# Patient Record
Sex: FEMALE | Race: WHITE | Employment: PART TIME | URBAN - METROPOLITAN AREA
[De-identification: names, ages, dates, MRNs, and addresses within clinical notes are randomized per-mention and may not be internally consistent; named-entity substitution may affect disease eponyms.]

---

## 2017-12-04 ENCOUNTER — HOSPITAL ENCOUNTER (OUTPATIENT)
Dept: PREADMISSION TESTING | Age: 57
Discharge: HOME | End: 2017-12-04
Payer: COMMERCIAL

## 2017-12-04 ENCOUNTER — HOSPITAL ENCOUNTER (OUTPATIENT)
Dept: LAB | Age: 57
Discharge: HOME | End: 2017-12-04
Payer: COMMERCIAL

## 2017-12-04 ENCOUNTER — HOSPITAL ENCOUNTER (OUTPATIENT)
Dept: LAB | Age: 57
Discharge: HOME | End: 2017-12-04

## 2017-12-04 DIAGNOSIS — Z01.818 PRE-OP TESTING: ICD-10-CM

## 2017-12-04 LAB — SENTARA SPECIMEN COL,SENBCF: NORMAL

## 2017-12-04 PROCEDURE — 86870 RBC ANTIBODY IDENTIFICATION: CPT | Performed by: OBSTETRICS & GYNECOLOGY

## 2017-12-04 PROCEDURE — 86900 BLOOD TYPING SEROLOGIC ABO: CPT | Performed by: OBSTETRICS & GYNECOLOGY

## 2017-12-04 PROCEDURE — 99001 SPECIMEN HANDLING PT-LAB: CPT | Performed by: OBSTETRICS & GYNECOLOGY

## 2017-12-04 PROCEDURE — 93005 ELECTROCARDIOGRAM TRACING: CPT

## 2017-12-04 RX ORDER — LOSARTAN POTASSIUM 100 MG/1
100 TABLET ORAL DAILY
COMMUNITY

## 2017-12-04 RX ORDER — SODIUM CHLORIDE, SODIUM LACTATE, POTASSIUM CHLORIDE, CALCIUM CHLORIDE 600; 310; 30; 20 MG/100ML; MG/100ML; MG/100ML; MG/100ML
125 INJECTION, SOLUTION INTRAVENOUS CONTINUOUS
Status: CANCELLED | OUTPATIENT
Start: 2017-12-13 | End: 2017-12-14

## 2017-12-04 RX ORDER — FLUTICASONE PROPIONATE AND SALMETEROL 250; 50 UG/1; UG/1
1 POWDER RESPIRATORY (INHALATION) EVERY 12 HOURS
COMMUNITY

## 2017-12-04 RX ORDER — SODIUM CHLORIDE 0.9 % (FLUSH) 0.9 %
5-10 SYRINGE (ML) INJECTION EVERY 8 HOURS
Status: CANCELLED | OUTPATIENT
Start: 2017-12-04

## 2017-12-04 RX ORDER — SODIUM CHLORIDE 0.9 % (FLUSH) 0.9 %
5-10 SYRINGE (ML) INJECTION AS NEEDED
Status: CANCELLED | OUTPATIENT
Start: 2017-12-04

## 2017-12-04 NOTE — PERIOP NOTES
PAT - SURGICAL PRE-ADMISSION INSTRUCTIONS    NAME:  Blossom Callahan                                                          TODAY'S DATE:  12/4/2017    SURGERY DATE:  12/13/2017                                  SURGERY ARRIVAL TIME:   0600    1. Do NOT eat or drink anything, including candy or gum, after MIDNIGHT on 12/12/17 , unless you have specific instructions from your Surgeon or Anesthesia Provider to do so. 2. No smoking on the day of surgery. 3. No alcohol 24 hours prior to the day of surgery. 4. No recreational drugs for one week prior to the day of surgery. 5. Leave all valuables, including money/purse, at home. 6. Remove all jewelry, nail polish, makeup (including mascara); no lotions, powders, deodorant, or perfume/cologne/after shave. 7. Glasses/Contact lenses and Dentures may be worn to the hospital.  They will be removed prior to surgery. 8. Call your doctor if symptoms of a cold or illness develop within 24 ours prior to surgery. 9. AN ADULT MUST DRIVE YOU HOME AFTER OUTPATIENT SURGERY. 10. If you are having an OUTPATIENT procedure, please make arrangements for a responsible adult to be with you for 24 hours after your surgery. 11. If you are admitted to the hospital, you will be assigned to a bed after surgery is complete. Normally a family member will not be able to see you until you are in your assigned bed. 15. Family is encouraged to accompany you to the hospital.  We ask visitors in the treatment area to be limited to ONE person at a time to ensure patient privacy. EXCEPTIONS WILL BE MADE AS NEEDED. 15. Children under 12 are discouraged from entering the treatment area and need to be supervised by an adult when in the waiting room. Special Instructions:    Bring inhaler. , Take these medications the morning of surgery with a sip of water:  LEVOTHYROXINE, STOP anticoagulants AT LEAST 1 WEEK PRIOR to your surgery or, follow other MD instructions:  ANY ASPIRIN PRODUCTS    Patient Prep:    use CHG solution    These surgical instructions were reviewed with PATIENT during the PAT VISIT. A printed copy of the instructions was provided to PATIENT. Directions: On the morning of surgery, please go to the [facility]. Enter the building from the [facility] entrance, 1st floor (next to the Emergency Room entrance). Take the elevator to the 2nd floor. Sign in at the Registration Desk.     If you have any questions and/or concerns, please do not hesitate to call:  (Prior to the day of surgery)  [facility] unit:  [phone]  (Day of surgery)  [facility] unit:  [phone]

## 2017-12-05 LAB
ATRIAL RATE: 89 BPM
CALCULATED P AXIS, ECG09: 45 DEGREES
CALCULATED R AXIS, ECG10: 40 DEGREES
CALCULATED T AXIS, ECG11: 60 DEGREES
DIAGNOSIS, 93000: NORMAL
P-R INTERVAL, ECG05: 186 MS
Q-T INTERVAL, ECG07: 380 MS
QRS DURATION, ECG06: 92 MS
QTC CALCULATION (BEZET), ECG08: 462 MS
VENTRICULAR RATE, ECG03: 89 BPM

## 2017-12-12 ENCOUNTER — ANESTHESIA EVENT (OUTPATIENT)
Dept: SURGERY | Age: 57
End: 2017-12-12
Payer: COMMERCIAL

## 2017-12-12 NOTE — H&P
[address] [facility]  [address]  [phone]       Gynecology History and Physical    Name: Dl Valdez MRN: [id number] SSN: [id number]    YOB: 1960  Age: 56 y.o. Sex: female       Subjective:      Chief complaint:   n92.0 menorrhagia w/reg cycle    Ama is a 56 y.o. WHITE OR  female with a history of n92.0 menorrhagia w/reg cycle. She is admitted for Procedure(s) (LRB):  HYSTEROSCOPY,endometerial ablation possible dilation and curttage (N/A). Initial visit 11/13 for irregular menses for 18 months and occasional heavy bleeding. 11/13 Labs : hgb 14, TSH 3.5, ferritin 54, Pap: NIL/HPV HR neg   11/20 : US and endo biopsy performed   US: 9.2x5. 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 Social History Main Topics    Smoking status: Never Smoker    Smokeless tobacco: Never Used    Alcohol use No    Drug use: No    Sexual activity: Not on file     History reviewed. No pertinent family history. Allergies   Allergen Reactions    Shellfish Derived Anaphylaxis    Augmentin [Amoxicillin-Pot Clavulanate] Hives    Erythromycin Other (comments)     Bronchial spasms     Prior to Admission medications    Medication Sig Start Date End Date Taking?  Authorizing Provider   BUTALB/ACETAMINOPHEN/CAFFEINE (FIORICET PO) Take by mouth. Yes Historical Provider   fluticasone-salmeterol (ADVAIR DISKUS) 250-50 mcg/dose diskus inhaler Take 1 Puff by inhalation every twelve (12) hours. Yes Historical Provider   losartan (COZAAR) 100 mg tablet Take 100 mg by mouth daily. Yes Historical Provider   levothyroxine (SYNTHROID) 25 mcg tablet Take 25 mcg by mouth Daily (before breakfast). Yes Historical Provider   SUMAtriptan (IMITREX) 6 mg/0.5 mL injection 6 mg by SubCUTAneous route once. Yes Historical Provider   promethazine (PHENERGAN) 50 mg suppository Insert 50 mg into rectum every six (6) hours as needed for Nausea. Yes Historical Provider   naproxen (NAPROSYN) 500 mg tablet Take 500 mg by mouth as needed. Yes Historical Provider   albuterol (PROVENTIL HFA, VENTOLIN HFA) 90 mcg/actuation inhaler Take  by inhalation every four (4) hours as needed for Wheezing. Historical Provider        Review of Systems:  A comprehensive review of systems was negative except for that written in the History of Present Illness.      Objective:     Vitals:    12/04/17 1552   Weight: 70.3 kg (155 lb)   Height: 5' 4.5\" (1.638 m)       Labs:       WBC   Date/Time Value Ref Range Status   09/08/2014 01:19 PM 7.0 4.6 - 13.2 K/uL Final     HGB   Date/Time Value Ref Range Status   09/08/2014 01:19 PM 15.0 12.0 - 16.0 g/dL Final     PLATELET   Date/Time Value Ref Range Status   09/08/2014 01:19  135 - 420 K/uL Final       Physical Examination:  General appearance: - alert, well appearing, and in no distress  Mental status - alert, oriented to person, place, and time  Neurological - alert, oriented, normal speech, no focal findings or movement disorder noted  Extremities - peripheral pulses normal, no pedal edema, no clubbing or cyanosis  Chest - clear to auscultation and percussion, no wheezes, rales or rhonchi, symmetric air entry  Heart - normal rate, regular rhythm, normal S1, S2, no murmurs, rubs, clicks or gallops  Abdomen - soft, nontender, nondistended, no masses or organomegaly  Pelvic - normal external genitalia, vulva, vagina, cervix, uterus and adnexa          Assessment:      with  n92.0 menorrhagia w/reg cycle, endometrial polyp     Plan:     Procedure(s) (LRB):  HYSTEROSCOPY,endometerial ablation possible dilation and curttage (N/A)      Discussed the risks of surgery including the risks of bleeding, infection, deep vein thrombosis, and surgical injuries to internal organs including but not limited to the bowels, bladder, rectum, and female reproductive organs. The patient understands the risks; any and all questions were answered to the patient's satisfaction.     Signed By:  Zoe Fox MD     December 12, 2017

## 2017-12-13 ENCOUNTER — ANESTHESIA (OUTPATIENT)
Dept: SURGERY | Age: 57
End: 2017-12-13
Payer: COMMERCIAL

## 2017-12-13 ENCOUNTER — HOSPITAL ENCOUNTER (OUTPATIENT)
Age: 57
Setting detail: OUTPATIENT SURGERY
Discharge: HOME | End: 2017-12-13
Attending: OBSTETRICS & GYNECOLOGY | Admitting: OBSTETRICS & GYNECOLOGY
Payer: COMMERCIAL

## 2017-12-13 VITALS
WEIGHT: 159.56 LBS | DIASTOLIC BLOOD PRESSURE: 72 MMHG | OXYGEN SATURATION: 95 % | HEIGHT: 65 IN | RESPIRATION RATE: 16 BRPM | TEMPERATURE: 98.3 F | SYSTOLIC BLOOD PRESSURE: 125 MMHG | HEART RATE: 75 BPM | BODY MASS INDEX: 26.59 KG/M2

## 2017-12-13 PROBLEM — N92.0 MENORRHAGIA: Status: ACTIVE | Noted: 2017-12-13

## 2017-12-13 LAB
ABO + RH BLD: NORMAL
BLOOD BANK CMNT PATIENT-IMP: NORMAL
BLOOD GROUP ANTIBODIES SERPL: NORMAL
BLOOD GROUP ANTIBODIES SERPL: NORMAL
HCG UR QL: NEGATIVE
SPECIMEN EXP DATE BLD: NORMAL

## 2017-12-13 PROCEDURE — 77030012510 HC MSK AIRWY LMA TELE -B: Performed by: ANESTHESIOLOGY

## 2017-12-13 PROCEDURE — 77030005537 HC CATH URETH BARD -A: Performed by: OBSTETRICS & GYNECOLOGY

## 2017-12-13 PROCEDURE — 74011250636 HC RX REV CODE- 250/636

## 2017-12-13 PROCEDURE — 74011250637 HC RX REV CODE- 250/637: Performed by: NURSE ANESTHETIST, CERTIFIED REGISTERED

## 2017-12-13 PROCEDURE — 76210000021 HC REC RM PH II 0.5 TO 1 HR: Performed by: OBSTETRICS & GYNECOLOGY

## 2017-12-13 PROCEDURE — 76060000033 HC ANESTHESIA 1 TO 1.5 HR: Performed by: OBSTETRICS & GYNECOLOGY

## 2017-12-13 PROCEDURE — 36415 COLL VENOUS BLD VENIPUNCTURE: CPT | Performed by: OBSTETRICS & GYNECOLOGY

## 2017-12-13 PROCEDURE — 74011000250 HC RX REV CODE- 250

## 2017-12-13 PROCEDURE — 86922 COMPATIBILITY TEST ANTIGLOB: CPT | Performed by: OBSTETRICS & GYNECOLOGY

## 2017-12-13 PROCEDURE — 86920 COMPATIBILITY TEST SPIN: CPT | Performed by: OBSTETRICS & GYNECOLOGY

## 2017-12-13 PROCEDURE — 86921 COMPATIBILITY TEST INCUBATE: CPT | Performed by: OBSTETRICS & GYNECOLOGY

## 2017-12-13 PROCEDURE — 74011000258 HC RX REV CODE- 258

## 2017-12-13 PROCEDURE — 76210000016 HC OR PH I REC 1 TO 1.5 HR: Performed by: OBSTETRICS & GYNECOLOGY

## 2017-12-13 PROCEDURE — 76010000161 HC OR TIME 1 TO 1.5 HR INTENSV-TIER 1: Performed by: OBSTETRICS & GYNECOLOGY

## 2017-12-13 PROCEDURE — 77030013079 HC BLNKT BAIR HGGR 3M -A: Performed by: ANESTHESIOLOGY

## 2017-12-13 PROCEDURE — 88305 TISSUE EXAM BY PATHOLOGIST: CPT | Performed by: OBSTETRICS & GYNECOLOGY

## 2017-12-13 PROCEDURE — 77030032490 HC SLV COMPR SCD KNE COVD -B: Performed by: OBSTETRICS & GYNECOLOGY

## 2017-12-13 PROCEDURE — 77030018836 HC SOL IRR NACL ICUM -A: Performed by: OBSTETRICS & GYNECOLOGY

## 2017-12-13 PROCEDURE — 74011250637 HC RX REV CODE- 250/637: Performed by: ANESTHESIOLOGY

## 2017-12-13 PROCEDURE — 74011250636 HC RX REV CODE- 250/636: Performed by: NURSE ANESTHETIST, CERTIFIED REGISTERED

## 2017-12-13 PROCEDURE — 86900 BLOOD TYPING SEROLOGIC ABO: CPT | Performed by: OBSTETRICS & GYNECOLOGY

## 2017-12-13 PROCEDURE — 74011250636 HC RX REV CODE- 250/636: Performed by: ANESTHESIOLOGY

## 2017-12-13 PROCEDURE — 77030011275 HC ELECTRD DEV NOVA HOLO -G1: Performed by: OBSTETRICS & GYNECOLOGY

## 2017-12-13 PROCEDURE — 86870 RBC ANTIBODY IDENTIFICATION: CPT | Performed by: OBSTETRICS & GYNECOLOGY

## 2017-12-13 PROCEDURE — 77030032151 HC HYSTSCP FLD MGMT KT DISP S&N -D: Performed by: OBSTETRICS & GYNECOLOGY

## 2017-12-13 PROCEDURE — 81025 URINE PREGNANCY TEST: CPT

## 2017-12-13 RX ORDER — FENTANYL CITRATE 50 UG/ML
INJECTION, SOLUTION INTRAMUSCULAR; INTRAVENOUS AS NEEDED
Status: DISCONTINUED | OUTPATIENT
Start: 2017-12-13 | End: 2017-12-13 | Stop reason: HOSPADM

## 2017-12-13 RX ORDER — DEXAMETHASONE SODIUM PHOSPHATE 4 MG/ML
INJECTION, SOLUTION INTRA-ARTICULAR; INTRALESIONAL; INTRAMUSCULAR; INTRAVENOUS; SOFT TISSUE AS NEEDED
Status: DISCONTINUED | OUTPATIENT
Start: 2017-12-13 | End: 2017-12-13 | Stop reason: HOSPADM

## 2017-12-13 RX ORDER — LIDOCAINE HYDROCHLORIDE 20 MG/ML
INJECTION, SOLUTION EPIDURAL; INFILTRATION; INTRACAUDAL; PERINEURAL AS NEEDED
Status: DISCONTINUED | OUTPATIENT
Start: 2017-12-13 | End: 2017-12-13 | Stop reason: HOSPADM

## 2017-12-13 RX ORDER — ONDANSETRON 2 MG/ML
4 INJECTION INTRAMUSCULAR; INTRAVENOUS ONCE
Status: COMPLETED | OUTPATIENT
Start: 2017-12-13 | End: 2017-12-13

## 2017-12-13 RX ORDER — INSULIN LISPRO 100 [IU]/ML
INJECTION, SOLUTION INTRAVENOUS; SUBCUTANEOUS ONCE
Status: DISCONTINUED | OUTPATIENT
Start: 2017-12-13 | End: 2017-12-13 | Stop reason: HOSPADM

## 2017-12-13 RX ORDER — ONDANSETRON 2 MG/ML
INJECTION INTRAMUSCULAR; INTRAVENOUS AS NEEDED
Status: DISCONTINUED | OUTPATIENT
Start: 2017-12-13 | End: 2017-12-13 | Stop reason: HOSPADM

## 2017-12-13 RX ORDER — IBUPROFEN 800 MG/1
800 TABLET ORAL
Qty: 30 TAB | Refills: 0 | Status: SHIPPED | OUTPATIENT
Start: 2017-12-13

## 2017-12-13 RX ORDER — OXYCODONE HYDROCHLORIDE 5 MG/1
5 TABLET ORAL
Status: COMPLETED | OUTPATIENT
Start: 2017-12-13 | End: 2017-12-13

## 2017-12-13 RX ORDER — SCOLOPAMINE TRANSDERMAL SYSTEM 1 MG/1
1.5 PATCH, EXTENDED RELEASE TRANSDERMAL ONCE
Status: DISCONTINUED | OUTPATIENT
Start: 2017-12-13 | End: 2017-12-13 | Stop reason: HOSPADM

## 2017-12-13 RX ORDER — FENTANYL CITRATE 50 UG/ML
50 INJECTION, SOLUTION INTRAMUSCULAR; INTRAVENOUS
Status: DISCONTINUED | OUTPATIENT
Start: 2017-12-13 | End: 2017-12-13 | Stop reason: HOSPADM

## 2017-12-13 RX ORDER — KETOROLAC TROMETHAMINE 30 MG/ML
INJECTION, SOLUTION INTRAMUSCULAR; INTRAVENOUS AS NEEDED
Status: DISCONTINUED | OUTPATIENT
Start: 2017-12-13 | End: 2017-12-13 | Stop reason: HOSPADM

## 2017-12-13 RX ORDER — FAMOTIDINE 20 MG/1
20 TABLET, FILM COATED ORAL ONCE
Status: COMPLETED | OUTPATIENT
Start: 2017-12-13 | End: 2017-12-13

## 2017-12-13 RX ORDER — SODIUM CHLORIDE, SODIUM LACTATE, POTASSIUM CHLORIDE, CALCIUM CHLORIDE 600; 310; 30; 20 MG/100ML; MG/100ML; MG/100ML; MG/100ML
75 INJECTION, SOLUTION INTRAVENOUS CONTINUOUS
Status: DISPENSED | OUTPATIENT
Start: 2017-12-13 | End: 2017-12-13

## 2017-12-13 RX ORDER — FAMOTIDINE 20 MG/1
TABLET, FILM COATED ORAL
Status: DISCONTINUED
Start: 2017-12-13 | End: 2017-12-13 | Stop reason: HOSPADM

## 2017-12-13 RX ORDER — SODIUM CHLORIDE, SODIUM LACTATE, POTASSIUM CHLORIDE, CALCIUM CHLORIDE 600; 310; 30; 20 MG/100ML; MG/100ML; MG/100ML; MG/100ML
50 INJECTION, SOLUTION INTRAVENOUS CONTINUOUS
Status: DISCONTINUED | OUTPATIENT
Start: 2017-12-13 | End: 2017-12-13 | Stop reason: HOSPADM

## 2017-12-13 RX ORDER — SODIUM CHLORIDE 0.9 % (FLUSH) 0.9 %
5-10 SYRINGE (ML) INJECTION AS NEEDED
Status: DISCONTINUED | OUTPATIENT
Start: 2017-12-13 | End: 2017-12-13 | Stop reason: HOSPADM

## 2017-12-13 RX ORDER — PROPOFOL 10 MG/ML
INJECTION, EMULSION INTRAVENOUS AS NEEDED
Status: DISCONTINUED | OUTPATIENT
Start: 2017-12-13 | End: 2017-12-13 | Stop reason: HOSPADM

## 2017-12-13 RX ORDER — MIDAZOLAM HYDROCHLORIDE 1 MG/ML
INJECTION, SOLUTION INTRAMUSCULAR; INTRAVENOUS AS NEEDED
Status: DISCONTINUED | OUTPATIENT
Start: 2017-12-13 | End: 2017-12-13 | Stop reason: HOSPADM

## 2017-12-13 RX ORDER — PROMETHAZINE HYDROCHLORIDE 25 MG/ML
12.5 INJECTION, SOLUTION INTRAMUSCULAR; INTRAVENOUS ONCE
Status: COMPLETED | OUTPATIENT
Start: 2017-12-13 | End: 2017-12-13

## 2017-12-13 RX ORDER — DIPHENHYDRAMINE HYDROCHLORIDE 50 MG/ML
12.5 INJECTION, SOLUTION INTRAMUSCULAR; INTRAVENOUS
Status: DISCONTINUED | OUTPATIENT
Start: 2017-12-13 | End: 2017-12-13 | Stop reason: HOSPADM

## 2017-12-13 RX ORDER — NALOXONE HYDROCHLORIDE 0.4 MG/ML
0.1 INJECTION, SOLUTION INTRAMUSCULAR; INTRAVENOUS; SUBCUTANEOUS AS NEEDED
Status: DISCONTINUED | OUTPATIENT
Start: 2017-12-13 | End: 2017-12-13 | Stop reason: HOSPADM

## 2017-12-13 RX ORDER — HYDROCODONE BITARTRATE AND ACETAMINOPHEN 5; 325 MG/1; MG/1
1 TABLET ORAL
Qty: 12 TAB | Refills: 0 | Status: SHIPPED | OUTPATIENT
Start: 2017-12-13

## 2017-12-13 RX ADMIN — PROMETHAZINE HYDROCHLORIDE 12.5 MG: 25 INJECTION INTRAMUSCULAR; INTRAVENOUS at 09:52

## 2017-12-13 RX ADMIN — FAMOTIDINE 20 MG: 20 TABLET ORAL at 06:49

## 2017-12-13 RX ADMIN — SODIUM CHLORIDE, SODIUM LACTATE, POTASSIUM CHLORIDE, AND CALCIUM CHLORIDE 50 ML/HR: 600; 310; 30; 20 INJECTION, SOLUTION INTRAVENOUS at 06:49

## 2017-12-13 RX ADMIN — FENTANYL CITRATE 25 MCG: 50 INJECTION, SOLUTION INTRAMUSCULAR; INTRAVENOUS at 09:28

## 2017-12-13 RX ADMIN — ONDANSETRON 4 MG: 2 INJECTION INTRAMUSCULAR; INTRAVENOUS at 07:55

## 2017-12-13 RX ADMIN — KETOROLAC TROMETHAMINE 30 MG: 30 INJECTION, SOLUTION INTRAMUSCULAR; INTRAVENOUS at 08:52

## 2017-12-13 RX ADMIN — FENTANYL CITRATE 25 MCG: 50 INJECTION, SOLUTION INTRAMUSCULAR; INTRAVENOUS at 09:18

## 2017-12-13 RX ADMIN — FENTANYL CITRATE 50 MCG: 50 INJECTION, SOLUTION INTRAMUSCULAR; INTRAVENOUS at 10:01

## 2017-12-13 RX ADMIN — MIDAZOLAM HYDROCHLORIDE 2 MG: 1 INJECTION, SOLUTION INTRAMUSCULAR; INTRAVENOUS at 07:44

## 2017-12-13 RX ADMIN — ONDANSETRON 4 MG: 2 INJECTION INTRAMUSCULAR; INTRAVENOUS at 09:28

## 2017-12-13 RX ADMIN — LIDOCAINE HYDROCHLORIDE 100 MG: 20 INJECTION, SOLUTION EPIDURAL; INFILTRATION; INTRACAUDAL; PERINEURAL at 07:50

## 2017-12-13 RX ADMIN — PROPOFOL 150 MG: 10 INJECTION, EMULSION INTRAVENOUS at 07:50

## 2017-12-13 RX ADMIN — FENTANYL CITRATE 50 MCG: 50 INJECTION, SOLUTION INTRAMUSCULAR; INTRAVENOUS at 07:44

## 2017-12-13 RX ADMIN — OXYCODONE HYDROCHLORIDE 5 MG: 5 TABLET ORAL at 10:46

## 2017-12-13 RX ADMIN — DEXAMETHASONE SODIUM PHOSPHATE 4 MG: 4 INJECTION, SOLUTION INTRA-ARTICULAR; INTRALESIONAL; INTRAMUSCULAR; INTRAVENOUS; SOFT TISSUE at 07:55

## 2017-12-13 RX ADMIN — FENTANYL CITRATE 50 MCG: 50 INJECTION, SOLUTION INTRAMUSCULAR; INTRAVENOUS at 08:35

## 2017-12-13 NOTE — OP NOTES
Full Operative Note:     Pre-op diagnosis: Menorrhagia, endometrial polyp  Post-op diagnosis: Same  Procedure: Hysteroscopy , dilation and curettage, Novasure endometrial ablation  Surgeon: Tio Jonas MD  Anesthesia: general anesthesia  EBL: 20cc  UOP : 200 ml  Specimen: uterine currettings      Findings: Uterus sounded 8.5 cm , smooth endometrial cavity      After assuring informed consent, patient was taken to the operating room where anesthesia was initiated without difficulty. She was placed in the dorsal lithotomy position and prepped and draped in the usual sterile fashion. Her bladder was drained with a red rubber catheter. Bimanual exam shows mobile uterus , no palpable adnexal masses     The anterior lip of the cervix was grasped with an Allis clamp and the uterus sounded to 8.5 cm. The cervix was found to be dilated  And 5mm hysteroscope was applied with above findings. Sharp curette was inserted and the cavity was curetted  And specimen sent  to pathology . Novasure device was set with uterine cavity length of 5.5cm,  Width 3.7cm, power 112w . Procedure last 1min and 40 sec. Device was safely removed. No bleeding observed. Hysteroscope re applied and endometrial shows good application and no signs of complications. All instruments were removed from the uterus and the clamp was removed from her cervix. Excellent hemostasis was noted  and the speculum was then removed from the patient's vagina.     The patient was awaked from anesthesia and taken to the recovery room awake, alert and in stable condition.   Sponge, lap and instrument counts were correct times two.     Tio Jonas MD  December 13, 2017

## 2017-12-13 NOTE — INTERVAL H&P NOTE
H&P Update:  Juan Carlos Kilgore was seen and examined. History and physical has been reviewed. The patient has been examined.  There have been no significant clinical changes since the completion of the originally dated History and Physical.    Signed By: Demetris Keith MD     December 13, 2017 7:30 AM

## 2017-12-13 NOTE — ANESTHESIA PREPROCEDURE EVALUATION
Anesthetic History   No history of anesthetic complications            Review of Systems / Medical History  Patient summary reviewed and pertinent labs reviewed    Pulmonary            Asthma : well controlled       Neuro/Psych   Within defined limits           Cardiovascular    Hypertension              Exercise tolerance: >4 METS     GI/Hepatic/Renal  Within defined limits              Endo/Other      Hypothyroidism: well controlled       Other Findings   Comments:   Risk Factors for Postoperative nausea/vomiting:       History of postoperative nausea/vomiting? NO       Female? YES       Motion sickness? NO       Intended opioid administration for postoperative analgesia? YES      Smoking Abstinence  Current Smoker? NO  Elective Surgery? YES  Seen preoperatively by anesthesiologist or proxy prior to day of surgery? YES  Pt abstained from smoking 24 hours prior to anesthesia?  N/A           Physical Exam    Airway  Mallampati: II  TM Distance: 4 - 6 cm  Neck ROM: normal range of motion   Mouth opening: Normal     Cardiovascular  Regular rate and rhythm,  S1 and S2 normal,  no murmur, click, rub, or gallop  Rhythm: regular  Rate: normal         Dental    Dentition: Lower dentition intact and Upper dentition intact     Pulmonary  Breath sounds clear to auscultation               Abdominal  GI exam deferred       Other Findings            Anesthetic Plan    ASA: 3  Anesthesia type: general          Induction: Intravenous  Anesthetic plan and risks discussed with: Patient

## 2017-12-13 NOTE — IP AVS SNAPSHOT
[address] [place] [address] [address] 
[phone] Patient: Paige Johansen MRN: [id number] About your hospitalization You were admitted on:  December 13, 2017 You last received care in the:  [facility] PACU You were discharged on:  December 13, 2017 Why you were hospitalized Your primary diagnosis was:  Not on File Your diagnoses also included:  Menorrhagia Things You Need To Do (next 8 weeks) Follow up with Yolanda Irwin MD  
  
Phone:  [phone] Where:  [address] [facility], [facility] [address] Discharge Orders None A check abundio indicates which time of day the medication should be taken. My Medications TAKE these medications as instructed Instructions Each Dose to Equal  
 Morning Noon Evening Bedtime ADVAIR DISKUS 250-50 mcg/dose diskus inhaler Generic drug:  fluticasone-salmeterol Your last dose was: Your next dose is: Take 1 Puff by inhalation every twelve (12) hours. 1 Puff  
    
   
   
   
  
 albuterol 90 mcg/actuation inhaler Commonly known as:  PROVENTIL HFA, VENTOLIN HFA, PROAIR HFA Your last dose was: Your next dose is: Take  by inhalation every four (4) hours as needed for Wheezing. COZAAR 100 mg tablet Generic drug:  losartan Your last dose was: Your next dose is: Take 100 mg by mouth daily. 100 mg  
    
   
   
   
  
 FIORICET PO Your last dose was: Your next dose is: Take  by mouth. HYDROcodone-acetaminophen 5-325 mg per tablet Commonly known as:  LORTAB 5-325 Your last dose was: Your next dose is: Take 1 Tab by mouth every six (6) hours as needed for Pain. Max Daily Amount: 4 Tabs. 1 Tab  
    
   
   
   
  
 ibuprofen 800 mg tablet Commonly known as:  MOTRIN Your last dose was: Your next dose is: Take 1 Tab by mouth every eight (8) hours as needed for Pain. 800 mg  
    
   
   
   
  
 IMITREX 6 mg/0.5 mL injection Generic drug:  SUMAtriptan Your last dose was: Your next dose is:    
   
   
 6 mg by SubCUTAneous route once. 6 mg NAPROSYN 500 mg tablet Generic drug:  naproxen Your last dose was: Your next dose is: Take 500 mg by mouth as needed. 500 mg PHENERGAN 50 mg suppository Generic drug:  promethazine Your last dose was: Your next dose is: Insert 50 mg into rectum every six (6) hours as needed for Nausea. 50 mg  
    
   
   
   
  
 synthroid 25 mcg tablet Generic drug:  levothyroxine Your last dose was: Your next dose is: Take 25 mcg by mouth Daily (before breakfast). 25 mcg Where to Get Your Medications Information on where to get these meds will be given to you by the nurse or doctor. ! Ask your nurse or doctor about these medications HYDROcodone-acetaminophen 5-325 mg per tablet  
 ibuprofen 800 mg tablet Discharge Instructions [address] [place] [address], [address], [facility] 
[phone] PROCEDURE: Procedure(s): HYSTEROSCOPY,endometerial ablation possible dilation and curttage [place] if any of the following occur: ? You are unable to urinate. Urgency to urinate is not uncommon. ? Excessive vaginal bleeding > 1 maxi pad an hour for more then 2 hours straight. ? Temperature above 101.0° and / or chills. ? You are nauseous and / or vomiting and you cannot hold down any fluids. ? Your pain is not controlled with the pain medication prescribed. Pelvic rest (nothing in the vagina) for 2 weeks. MEDICATIONS: PROVIDED AT DISCHARGE OR PREVIOUSLY PRESCRIBED AT PRE OPERATIVE APPOINTMENT WITH Cece Castle-- 
Narcotic Pain Med. Vicodin®     Percocet®     Dilaudid® Non-narcotic Pain Med. Ibuprofen Antibiotics     Cipro®     Keflex®       Bactrim DS® Urgency      Vesicare® Nausea Earlis Langton Phenergan® Other       Colace Please contact [address] at [phone] or go to the nearest Emergency Department / Urgent Care facility for any other medical questions or concerns. Joselin Singleton MD 
December 13, 2017 DISCHARGE SUMMARY from Nurse PATIENT INSTRUCTIONS: 
 
After general anesthesia or intravenous sedation, for 24 hours or while taking prescription Narcotics: · Limit your activities · Do not drive and operate hazardous machinery · Do not make important personal or business decisions · Do  not drink alcoholic beverages · If you have not urinated within 8 hours after discharge, please contact your surgeon on call. Report the following to your surgeon: 
· Excessive pain, swelling, redness or odor of or around the surgical area · Temperature over 100.5 · Nausea and vomiting lasting longer than 4 hours or if unable to take medications · Any signs of decreased circulation or nerve impairment to extremity: change in color, persistent  numbness, tingling, coldness or increase pain · Any questions What to do at Home: No smoking/ No tobacco products/ Avoid exposure to second hand smoke Surgeon General's Warning:  Quitting smoking now greatly reduces serious risk to your health. Obesity, smoking, and sedentary lifestyle greatly increases your risk for illness A healthy diet, regular physical exercise & weight monitoring are important for maintaining a healthy lifestyle You may be retaining fluid if you have a history of heart failure or if you experience any of the following symptoms:  Weight gain of 3 pounds or more overnight or 5 pounds in a week, increased swelling in our hands or feet or shortness of breath while lying flat in bed. Please call your doctor as soon as you notice any of these symptoms; do not wait until your next office visit. Recognize signs and symptoms of STROKE: 
 
F-face looks uneven A-arms unable to move or move unevenly S-speech slurred or non-existent T-time-call 911 as soon as signs and symptoms begin-DO NOT go Back to bed or wait to see if you get better-TIME IS BRAIN. Warning Signs of HEART ATTACK Call 911 if you have these symptoms: 
? Chest discomfort. Most heart attacks involve discomfort in the center of the chest that lasts more than a few minutes, or that goes away and comes back. It can feel like uncomfortable pressure, squeezing, fullness, or pain. ? Discomfort in other areas of the upper body. Symptoms can include pain or discomfort in one or both arms, the back, neck, jaw, or stomach. ? Shortness of breath with or without chest discomfort. ? Other signs may include breaking out in a cold sweat, nausea, or lightheadedness. Don't wait more than five minutes to call [address]! Fast action can save your life. Calling 911 is almost always the fastest way to get lifesaving treatment. Emergency Medical Services staff can begin treatment when they arrive  up to an hour sooner than if someone gets to the hospital by car. The discharge information has been reviewed with the patient. The patient verbalized understanding. Discharge medications reviewed with the patient and appropriate educational materials and side effects teaching were provided. Patient armband removed and given to patient to take home. Patient was informed of the privacy risks if armband lost or stolen Introducing [facility]! James Van introduces App[employer] patient portal. Now you can access parts of your medical record, email your doctor's office, and request medication refills online. 1. In your internet browser, go to [email or web address] 2. Click on the First Time User? Click Here link in the Sign In box. You will see the New Member Sign Up page. 3. Enter your AppTh[employer] Access Code exactly as it appears below. You will not need to use this code after youve completed the sign-up process. If you do not sign up before the expiration date, you must request a new code. · App[employer] Access Code: [email or web address] Expires: 3/12/2018 12:56 PM 
 
4. Enter the last four digits of your Social Security Number (xxxx) and Date of Birth (mm/dd/yyyy) as indicated and click Submit. You will be taken to the next sign-up page. 5. Create a AppThwack ID. This will be your AppTh[employer] login ID and cannot be changed, so think of one that is secure and easy to remember. 6. Create a App[employer] password. You can change your password at any time. 7. Enter your Password Reset Question and Answer. This can be used at a later time if you forget your password. 8. Enter your e-mail address. You will receive e-mail notification when new information is available in [address] 9. Click Sign Up. You can now view and download portions of your medical record. 10. Click the Download Summary menu link to download a portable copy of your medical information. If you have questions, please visit the Frequently Asked Questions section of the [employer] website. Remember, AppTh[employer]ack is NOT to be used for urgent needs. For medical emergencies, dial 911. Now available from your iPhone and Android! Providers Seen During Your Hospitalization Provider Specialty Primary office phone Sonya Murphy MD Obstetrics & Gynecology [phone] Your Primary Care Physician (PCP) Primary Care Physician Office Phone Office Fax [address] [place] [address] [phone] You are allergic to the following Allergen Reactions Shellfish Derived Anaphylaxis Augmentin (Amoxicillin-Pot Clavulanate) Hives Erythromycin Other (comments) Bronchial spasms Recent Documentation Height Weight BMI OB Status Smoking Status 1.638 m 72.4 kg 26.97 kg/m2 Having regular periods Never Smoker Emergency Contacts Name Discharge Info Relation Home Work Mobile [address] CAREGIVER [3] Spouse [3]  [phone] Patient Belongings The following personal items are in your possession at time of discharge: 
  Dental Appliances: None  Visual Aid: Contacts Please provide this summary of care documentation to your next provider. Signatures-by signing, you are acknowledging that this After Visit Summary has been reviewed with you and you have received a copy. Patient Signature:  ____________________________________________________________ Date:  ____________________________________________________________  
  
Sarai Parraer Provider Signature:  ____________________________________________________________ Date:  ____________________________________________________________

## 2017-12-13 NOTE — H&P
Full Operative Note:    Pre-op diagnosis: Menorrhagia, endometrial polyp  Post-op diagnosis: Same  Procedure: Hysteroscopy , dilation and curettage, Novasure endometrial ablation  Surgeon: Beryl Soler MD  Anesthesia: general anesthesia  EBL: 20cc  UOP : 200 ml  Specimen: uterine currettings     Findings: Uterus sounded 8.5 cm , smooth endometrial cavity     After assuring informed consent, patient was taken to the operating room where anesthesia was initiated without difficulty. She was placed in the dorsal lithotomy position and prepped and draped in the usual sterile fashion. Her bladder was drained with a red rubber catheter. Bimanual exam shows mobile uterus , no palpable adnexal masses    The anterior lip of the cervix was grasped with an Allis clamp and the uterus sounded to 8.5 cm. The cervix was found to be dilated  And 5mm hysteroscope was applied with above findings. Sharp curette was inserted and the cavity was curetted  And specimen sent  to pathology . Novasure device was set with uterine cavity length of 5.5cm,  Width 3.7cm, power 112w . Procedure last 1min and 40 sec. Device was safely removed. No bleeding observed. Hysteroscope re applied and endometrial shows good application and no signs of complications. All instruments were removed from the uterus and the clamp was removed from her cervix. Excellent hemostasis was noted  and the speculum was then removed from the patient's vagina. The patient was awaked from anesthesia and taken to the recovery room awake, alert and in stable condition. Sponge, lap and instrument counts were correct times two.     Beryl Soler MD  December 13, 2017

## 2017-12-13 NOTE — DISCHARGE INSTRUCTIONS
[address] [place] [address], [address], [place], [address] [facility] [address]  [phone]      PROCEDURE: Procedure(s): HYSTEROSCOPY,endometerial ablation possible dilation and curttage    Notify [address] [place] [address] if any of the following occur:     You are unable to urinate. Urgency to urinate is not uncommon.  Excessive vaginal bleeding > 1 maxi pad an hour for more then 2 hours straight.  Temperature above 101.0° and / or chills.  You are nauseous and / or vomiting and you cannot hold down any fluids.  Your pain is not controlled with the pain medication prescribed. [x] Pelvic rest (nothing in the vagina) for 2 weeks. MEDICATIONS: PROVIDED AT DISCHARGE OR PREVIOUSLY PRESCRIBED AT PRE OPERATIVE APPOINTMENT WITH [facility] CENTERS--  Narcotic Pain Med.   []  Vicodin®   [x]  Percocet®   []  Dilaudid®    Non-narcotic Pain Med. [x]   Ibuprofen        Antibiotics   []  Cipro®   []  Keflex®     []  Bactrim DS®       Urgency   []   Vesicare®       Nausea      []    Zofran®     []    Phenergan®       Other   [x]    Colace              Please contact [address] at [phone] or go to the nearest Emergency Department / Urgent Care facility for any other medical questions or concerns. Cheyenne Lorenzana MD  December 13, 2017       DISCHARGE SUMMARY from Nurse    PATIENT INSTRUCTIONS:    After general anesthesia or intravenous sedation, for 24 hours or while taking prescription Narcotics:  · Limit your activities  · Do not drive and operate hazardous machinery  · Do not make important personal or business decisions  · Do  not drink alcoholic beverages  · If you have not urinated within 8 hours after discharge, please contact your surgeon on call.     Report the following to your surgeon:  · Excessive pain, swelling, redness or odor of or around the surgical area  · Temperature over 100.5  · Nausea and vomiting lasting longer than 4 hours or if unable to take medications  · Any signs of decreased circulation or nerve impairment to extremity: change in color, persistent  numbness, tingling, coldness or increase pain  · Any questions    What to do at Home:  No smoking/ No tobacco products/ Avoid exposure to second hand smoke  Surgeon General's Warning:  Quitting smoking now greatly reduces serious risk to your health. Obesity, smoking, and sedentary lifestyle greatly increases your risk for illness    A healthy diet, regular physical exercise & weight monitoring are important for maintaining a healthy lifestyle    You may be retaining fluid if you have a history of heart failure or if you experience any of the following symptoms:  Weight gain of 3 pounds or more overnight or 5 pounds in a week, increased swelling in our hands or feet or shortness of breath while lying flat in bed. Please call your doctor as soon as you notice any of these symptoms; do not wait until your next office visit. Recognize signs and symptoms of STROKE:    F-face looks uneven    A-arms unable to move or move unevenly    S-speech slurred or non-existent    T-time-call 911 as soon as signs and symptoms begin-DO NOT go       Back to bed or wait to see if you get better-TIME IS BRAIN. Warning Signs of HEART ATTACK     Call 911 if you have these symptoms:   Chest discomfort. Most heart attacks involve discomfort in the center of the chest that lasts more than a few minutes, or that goes away and comes back. It can feel like uncomfortable pressure, squeezing, fullness, or pain.  Discomfort in other areas of the upper body. Symptoms can include pain or discomfort in one or both arms, the back, neck, jaw, or stomach.  Shortness of breath with or without chest discomfort.  Other signs may include breaking out in a cold sweat, nausea, or lightheadedness. Don't wait more than five minutes to call 911 - MINUTES MATTER! Fast action can save your life.  Calling 911 is almost always the fastest way to get lifesaving treatment. Emergency Medical Services staff can begin treatment when they arrive -- up to an hour sooner than if someone gets to the hospital by car. The discharge information has been reviewed with the patient. The patient verbalized understanding. Discharge medications reviewed with the patient and appropriate educational materials and side effects teaching were provided. Patient armband removed and given to patient to take home.   Patient was informed of the privacy risks if armband lost or stolen

## 2017-12-13 NOTE — PERIOP NOTES
Patient received, report from anesthesia, pt assessed. Pt resting with eyes closed, arouses to light stimulation, VSS, no vaginal bleeding noted, leilani pad placed.

## 2017-12-13 NOTE — INTERVAL H&P NOTE
H&P Update:  Masha Vazquez was seen and examined. History and physical has been reviewed. Significant clinical changes have occurred as noted: Added hx :  , hx TSVD. Past Med hx: hypothyroidism, migraines and asthma.     Signed By: Antoni Page MD     2017 7:45 AM

## 2017-12-13 NOTE — PERIOP NOTES
Pt with continued nausea and cramping. At baseline pt has a lot of nausea (takes Zofran, phenergan suppositories, and scope patch @ home). 4mg Zofran already given per [facility] order. Spoke with Dr. Richard Mendoza - will order phenergan and scope patch. 1000 - Pt's  [employer] updated in [place].

## 2017-12-13 NOTE — ANESTHESIA POSTPROCEDURE EVALUATION
Post-Anesthesia Evaluation and Assessment    Patient: Babette Habermann MRN: [id number]  SSN: [id number]    YOB: 1960  Age: 56 y.o. Sex: female       Cardiovascular Function/Vital Signs  Visit Vitals    /76    Pulse 79    Temp 36.8 °C (98.3 °F)    Resp 15    Ht 5' 4.5\" (1.638 m)    Wt 72.4 kg (159 lb 9 oz)    SpO2 95%    BMI 26.97 kg/m2       Patient is status post general anesthesia for Procedure(s): HYSTEROSCOPY,endometerial ablation possible dilation and curttage. Nausea/Vomiting: None    Postoperative hydration reviewed and adequate. Pain:  Pain Scale 1: Numeric (0 - 10) (12/13/17 1008)  Pain Intensity 1: 4 (12/13/17 1008)   Managed    Neurological Status:   Neuro (WDL): Within Defined Limits (12/13/17 0906)  Neuro  Neurologic State: Drowsy (post anesthesia) (12/13/17 0906)   At baseline    Mental Status and Level of Consciousness: Alert and oriented     Pulmonary Status:   O2 Device: Room air (12/13/17 8486)   Adequate oxygenation and airway patent    Complications related to anesthesia: None    Post-anesthesia assessment completed.  No concerns    Signed By: Mica Ward MD     December 13, 2017

## 2017-12-13 NOTE — ANESTHESIA POSTPROCEDURE EVALUATION
Post-Anesthesia Evaluation and Assessment    Patient: Aldo Cardoso MRN: [id number]  SSN: [id number]    YOB: 1960  Age: 56 y.o. Sex: female       Cardiovascular Function/Vital Signs  Visit Vitals    /76    Pulse 79    Temp 36.8 °C (98.3 °F)    Resp 15    Ht 5' 4.5\" (1.638 m)    Wt 72.4 kg (159 lb 9 oz)    SpO2 95%    BMI 26.97 kg/m2       Patient is status post general anesthesia for Procedure(s): HYSTEROSCOPY,endometerial ablation possible dilation and curttage. Nausea/Vomiting: None    Postoperative hydration reviewed and adequate. Pain:  Pain Scale 1: Numeric (0 - 10) (12/13/17 1008)  Pain Intensity 1: 4 (12/13/17 1008)   Managed    Neurological Status:   Neuro (WDL): Within Defined Limits (12/13/17 0906)  Neuro  Neurologic State: Drowsy (post anesthesia) (12/13/17 0906)   At baseline    Mental Status and Level of Consciousness: Arousable    Pulmonary Status:   O2 Device: Room air (12/13/17 0748)   Adequate oxygenation and airway patent    Complications related to anesthesia: None    Post-anesthesia assessment completed.  No concerns    Signed By: Rajni Gonzalez MD     December 13, 2017

## 2017-12-17 LAB
ABO + RH BLD: NORMAL
BLD PROD TYP BPU: NORMAL
BLD PROD TYP BPU: NORMAL
BLOOD GROUP ANTIBODIES SERPL: NORMAL
BLOOD GROUP ANTIBODIES SERPL: NORMAL
BPU ID: NORMAL
BPU ID: NORMAL
CROSSMATCH RESULT,%XM: NORMAL
CROSSMATCH RESULT,%XM: NORMAL
SPECIMEN EXP DATE BLD: NORMAL
STATUS OF UNIT,%ST: NORMAL
STATUS OF UNIT,%ST: NORMAL
UNIT DIVISION, %UDIV: 0
UNIT DIVISION, %UDIV: 0

## 2019-02-26 ENCOUNTER — HOSPITAL ENCOUNTER (OUTPATIENT)
Dept: PHYSICAL THERAPY | Age: 59
Discharge: HOME | End: 2019-02-26
Payer: COMMERCIAL

## 2019-02-26 PROCEDURE — 97110 THERAPEUTIC EXERCISES: CPT | Performed by: PHYSICAL THERAPIST

## 2019-02-26 PROCEDURE — 97162 PT EVAL MOD COMPLEX 30 MIN: CPT | Performed by: PHYSICAL THERAPIST

## 2019-02-26 PROCEDURE — 97016 VASOPNEUMATIC DEVICE THERAPY: CPT | Performed by: PHYSICAL THERAPIST

## 2019-02-26 PROCEDURE — 97140 MANUAL THERAPY 1/> REGIONS: CPT | Performed by: PHYSICAL THERAPIST

## 2019-02-26 NOTE — PROGRESS NOTES
[facility] PHYSICAL THERAPY AT [address] [place], [place] [address]  Phone: [phone] Fax: [phone] / [address] [address] PHYSICAL THERAPY SERVICES  Patient Name: Jefferson Ahumada : 1960   Medical   Diagnosis: Knee pain, right [M25.561]  Acute postoperative pain [G89.18] Treatment Diagnosis: R TKA   Onset Date: 19     Referral Source: Prudence Do MD [facility]): 2019   Prior Hospitalization: See medical history Provider #: [id number]   Prior Level of Function: Indep Ambulation   Comorbidities: OA, thyroid problems, HTN, Asthma   Medications: Verified on Patient Summary List   The Plan of Care and following information is based on the information from the initial evaluation.   ========================================================================  Assessment / key information:  Patient is a 66 y.o. female who presents to [facility]  with diagnosis of Knee pain, right [M25.561]  Acute postoperative pain [G89.18]. Patient is s/p R TKA due to modified Sindi procedure that increased knee pain and caused OA (per pt report). Patient's pain level ranges from 3/10 to 9/10. Upon objective evaluation patient presents with impaired and painful AROM/PROM of R knee, impaired strength in R quad, edema of 3cm  and decreased flexibility of  HS, gastroc and PF muscles. Patient ambulates with decreased TKE, heel strike and WB on R LE,( with elevated pelvis on R due to scoliosis) using SPC. Stairs with step to pattern. AROM/PROM of R knee as follows:  flexion 80/86 and extension -10/-8. Patient scored 28 on FOTO indicating very severe decreased function and quality of life. Functional limitations include dressing (putting on shoe), prolonged standing >10-15 min, steps with step to pattern, sleep disturbances, unable to drive, sit to stand from low surfaces.   A home exercise program was demonstrated and provided to address the above objective and functional deficits. Patient can benefit from skilled PT to increase strength, APROM, decrease pain and swelling to improve overall function and quality of life.  ===================================================================  Eval Complexity: History: MEDIUM  Complexity : 1-2 comorbidities / personal factors will impact the outcome/ POC Exam:MEDIUM Complexity : 3 Standardized tests and measures addressing body structure, function, activity limitation and / or participation in recreation  Presentation: MEDIUM Complexity : Evolving with changing characteristics  Clinical Decision Making:MEDIUM Complexity : FOTO score of 26-74Overall Complexity:MEDIUM  Problem List: pain affecting function, decrease ROM, decrease strength, edema affecting function, impaired gait/ balance, decrease ADL/ functional abilitiies, decrease activity tolerance, decrease flexibility/ joint mobility, decrease transfer abilities. Treatment Plan may include any combination of the following: Therapeutic exercise, Therapeutic activities, Neuromuscular re-education, Physical agent/modality, Gait/balance training, Manual therapy, Aquatic therapy and Patient education  Patient / Family readiness to learn indicated by: asking questions, trying to perform skills and interest  Persons(s) to be included in education: patient (P)  Barriers to Learning/Limitations: None  Measures taken:    Patient Goal (s): Rehab to prior condition   Patient self reported health status: good  Rehabilitation Potential: good   Short Term Goals: To be accomplished in 2  weeks:  1) Establish home exercise program.. 2) Increase AROM in R knee to -[id number] degrees to facilitate ADLS and dressing/transfers.  Long Term Goals: To be accomplished in  4  weeks:  1) Patient  independent with HEP. 2) Patient will improve R knee AROM to 0-120 degrees to facilitate gait, transfers,driving.    3) Increase FOTO to 62 indicating improved function and quality of life. Akil Hollingsworth 4) Patient will increase R knee strength in quad to 5 so patient has improved ability to descend steps reciprocal, and ambulate with TKE and normal gait. 5) Patient able to stand for >1hr without AD to progress towards return to work. Frequency / Duration:   Patient to be seen  2-3  times per week for 4  weeks:  Patient / Caregiver education and instruction: activity modification and exercises  G-Codes (GP): barrett    Therapist Signature: Regina Baum PT Date: 8/55/5172   Certification Period: na Time: 9:20 AM     ========================================================================  I certify that the above Physical Therapy Services are being furnished while the patient is under my care. I agree with the treatment plan and certify that this therapy is necessary. Physician Signature:        Date:       Time:   Please sign and return to [facility] or you may fax the signed copy to [phone]. Thank you.

## 2019-02-26 NOTE — PROGRESS NOTES
PT  EVAL AND TREATMENT    Patient Name: Husam Pryor  Date:2019  : 1960  [x]  Patient  Verified  Payor: Barbee Mcardle / Plan: [address] [place] [address] Rd PT / Product Type: Commerical /    In time:1000  Out time:1100  Total Treatment Time (min): 60  Total Timed Codes (min): 60  1:1 Treatment Time ( only): na   Visit #: 1 of     Treatment Area: Knee pain, right [M25.561]  Acute postoperative pain [G89.18]  Pain in: 4-5    Objective evaluation:  Physical Therapy Evaluation - Knee    Posture: R hip higher due to scoliosis since adolescence  Ambulation with SPC with decreased TKE and Heel strike    Gait:  [] Normal    [x] Abnormal    [] Antalgic    [] NWB    Device:    Describe:SPC with decreased WB and TKE    ROM / Strength  [] Unable to assess                  AROM                      PROM                   Strength (1-5)    Left Right Left Right Left Right   Hip Flexion WNL WNL   4+ 4-    Extension     4+ 4    Abduction     4+ 5    Adduction     4+ 4+   Knee Flexion 138 80  86 5 4    Extension 0 -10  -8 5 4-p! Ankle Plantarflexion          Dorsiflexion 9 2   5 4+       Flexibility: [] Unable to assess at this time  Hamstrings:    (L) Tightness= [] WNL   [x] Min   [] Mod   [] Severe    (R) Tightness= [] WNL   [] Min   [x] Mod   [] Severe  Quadriceps:    (L) Tightness= [] WNL   [x] Min   [] Mod   [] Severe    (R) Tightness= [] WNL   [] Min   [x] Mod   [] Severe  Gastroc:      (L) Tightness= [] WNL   [x] Min   [] Mod   [] Severe    (R) Tightness= [] WNL   [] Min   [x] Mod   [] Severe  Other:    Palpation:   Neg/Pos  Neg/Pos  Neg/Pos   Joint Line  Quad tendon  Patellar ligament    Patella  Fibular head  Pes Anserinus +   Tibial tubercle  Hamstring tendons med Infrapatellar fat pad                     Other tests/comments: DF: R 2-9 deg,  Balance: SLS on L >15 sec, ROM EC: 30 sec mod sway. Swelling at jt line 3cm on R. Incision healing nicely with no bruising noted.  GT extension limited due to bunion and OA and pt c/o PF pain/tightness at times    Justification for Eval Code Complexity:  Patient History : HTN, thyroid, OA,Asthma  Examination see exam as above   Clinical Presentation: evolving  Clinical Decision Making : FOTO : 35 /100    Manual: 8 min  Flexion and extension mobes, PROM, gastroc stretch, lateral calcaneal mobes for PF, Gt extention stretch    Modality (rationale): swelling and inflammation, pain  []  E-Stim: type _ x _ min     []att   []unatt   []w/US   []w/ice   []w/heat  []  Traction: []cerv   []pelvic   _ lbs x _ min     []pro   []sup   []int   []const  []  Ultrasound: []cont   []pulse    _ W/cm2 x _  min   []1MHz   []3MHz  []  Iontophoresis: []take home patch w/ dexamethazone    []40mA   []80mA                               []_ mA min w/: []dexamethazone   []other:_  []  Ice pack _  min     [] Hot pack _  min     [] Paraffin _  min  [x]  Other: vaso 15 min  Patient Education: [x] Established HEP    [x] POT (minutes) :15 min HEP    Pain Level (0-10 scale) post treatment: 4  ASSESSMENT  [x]  See Plan of Care    PLAN  [x]  Upgrade activities as tolerated     [x] Other:_ POC  2-3x/wk for 4 weeks    Obed Mcdermott, PT 2/26/2019  9:21 AM

## 2019-03-01 ENCOUNTER — HOSPITAL ENCOUNTER (OUTPATIENT)
Dept: PHYSICAL THERAPY | Age: 59
Discharge: HOME | End: 2019-03-01
Payer: COMMERCIAL

## 2019-03-01 PROCEDURE — 97140 MANUAL THERAPY 1/> REGIONS: CPT

## 2019-03-01 PROCEDURE — 97016 VASOPNEUMATIC DEVICE THERAPY: CPT

## 2019-03-01 PROCEDURE — 97110 THERAPEUTIC EXERCISES: CPT

## 2019-03-01 NOTE — PROGRESS NOTES
PT DAILY TREATMENT NOTE     Patient Name: Maureen Blum  Date:3/1/2019  : 1960  [x]  Patient  Verified  Payor: Rasheed Hammonds / Plan: [employer]  CAPITATED PT / Product Type: Commerical /    In time: 8:32 am       Out time: 9:34 am  Total Treatment Time (min): 62  Visit #: 2 of     Treatment Area: Knee pain, right [M25.561]  Acute postoperative pain [G89.18]    SUBJECTIVE  Pain Level (0-10 scale): 5  Any medication changes, allergies to medications, adverse drug reactions, diagnosis change, or new procedure performed?: [x] No    [] Yes (see summary sheet for update)  Subjective functional status/changes:   [] No changes reported  \"I have been doing the exercises at home without trouble. \"    OBJECTIVE  Modality rationale: decrease edema, decrease inflammation and decrease pain to improve the patients ability to > standing tolerance     Min Type Additional Details    [] Estim: []Att   []Unatt                  []IFC  []Premod                                            []NMES    []Other:  []w/ice   []w/heat  Position:  Location:    []  Traction: [] Cervical       [] Lumbar                       [] Supine        [] Prone                       []Intermittent   []Continuous Lbs:  [] before manual  [] after manual    []  Ultrasound: []Continuous   [] Pulsed                           []1MHz   []3MHz Location:  W/cm2:    []  Iontophoresis with dexamethasone         Location: [] Take home patch   [] In clinic    []  Ice     []  heat  []  Ice massage Position:  Location:   15 [x]  Vasopneumatic Device Pressure: [] lo [x] med [] hi   Temp: [] lo [x] med [] hi   [x] Skin assessment post-treatment:  [x]intact    []redness- no adverse reaction                                                                                 []redness - adverse reaction:     29 min Therapeutic Exercise:  [x] See flow sheet: initiated therex per IE   Rationale: increase ROM, increase strength and improve coordination to improve the patients ability to perform transfers, dress, tolerate prolonged standing     10 min Manual Therapy: (R) knee PROM f/b STM to medial hamstrings   Rationale: decrease pain, increase ROM and increase tissue extensibility to improve the patients ability to dress    8 min Gait Trainin feet x3 with SPC device on level surfaces with SBA and VCs for knee flexion during initial swing, TKE for proper heel strike and min knee flexion during midstance   Rationale: increase proprioception, improve balance strategies, increase strength to improve the patient's ability to ambulate with reduced fall risk          with TE min Patient Education: [x] Review HEP from IE     Other Objective/Functional Measures:   PROM: 6-96 deg    Pain Level (0-10 scale) post treatment: 4 \"less stiff\"    ASSESSMENT/Changes in Function:   Good tolerance to treatment today with patient req 100% verbal/tactile cueing and demo for proper form/technique with all newly introduced therex. Patient will continue to benefit from skilled PT services to modify and progress therapeutic interventions, address functional mobility deficits, address ROM deficits, address strength deficits, analyze and address soft tissue restrictions, analyze and cue movement patterns, analyze and modify body mechanics/ergonomics, assess and modify postural abnormalities and address imbalance/dizziness to attain remaining goals. [x]  See Plan of Care  []  See progress note/recertification  []  See Discharge Summary         Progress towards goals / Updated goals: · Short Term Goals: To be accomplished in 2  weeks:  1) Establish home exercise program. -Goal met; pt notes compliance (3/1/19)  2) Increase AROM in R knee to -[id number] degrees to facilitate ADLS and dressing/transfers. -Goal progressing; pt demos 6-96 deg PROM in supine (3/1/19)  · Long Term Goals: To be accomplished in  4  weeks:  1) Patient  independent with HEP.   2) Patient will improve R knee AROM to 0-120 degrees to facilitate gait, transfers,driving. 3) Increase FOTO to 62 indicating improved function and quality of life. Alejandrona Rout 4) Patient will increase R knee strength in quad to 5 so patient has improved ability to descend steps reciprocal, and ambulate with TKE and normal gait. 5) Patient able to stand for >1hr without AD to progress towards return to work.     PLAN  [x]  Upgrade activities as tolerated     [x]  Continue plan of care  []  Update interventions per flow sheet       []  Discharge due to:_  [x]  Other: assess response to treatment     Jana Valladares, MAX 3/1/2019

## 2019-03-04 ENCOUNTER — HOSPITAL ENCOUNTER (OUTPATIENT)
Dept: PHYSICAL THERAPY | Age: 59
Discharge: HOME | End: 2019-03-04
Payer: COMMERCIAL

## 2019-03-04 PROCEDURE — 97140 MANUAL THERAPY 1/> REGIONS: CPT | Performed by: PHYSICAL THERAPIST

## 2019-03-04 PROCEDURE — 97016 VASOPNEUMATIC DEVICE THERAPY: CPT | Performed by: PHYSICAL THERAPIST

## 2019-03-04 PROCEDURE — 97110 THERAPEUTIC EXERCISES: CPT | Performed by: PHYSICAL THERAPIST

## 2019-03-04 NOTE — PROGRESS NOTES
PT DAILY TREATMENT NOTE     Patient Name: Jules Samples  Date:3/4/2019  : 1960  [x]  Patient  Verified  Payor: Carol Patton / Plan: [facility]  CAPITATED PT / Product Type: Commerical /    In time:8:29  Out time:9:36  Total Treatment Time (min): 67  Total Timed Codes (min): 55  1:1 Treatment Time (min): NA   Visit #: 3 of     Treatment Area: Knee pain, right [M25.561]  Acute postoperative pain [G89.18]    SUBJECTIVE  Pain Level (0-10 scale): 3/10  Any medication changes, allergies to medications, adverse drug reactions, diagnosis change, or new procedure performed?: [x] No    [] Yes (see summary sheet for update)  Subjective functional status/changes:   [] No changes reported  Patient states she works on her knee daily.        OBJECTIVE  Modality rationale: decrease edema, decrease inflammation and decrease pain to improve the patients ability to > standing tolerance      Min Type Additional Details     [] Estim: []Att   []Unatt                  []IFC  []Premod                                            []NMES    []Other:  []w/ice   []w/heat  Position:  Location:     []  Traction: [] Cervical       [] Lumbar                       [] Supine        [] Prone                       []Intermittent   []Continuous Lbs:  [] before manual  [] after manual     []  Ultrasound: []Continuous   [] Pulsed                           []1MHz   []3MHz Location:  W/cm2:     []  Iontophoresis with dexamethasone         Location: [] Take home patch   [] In clinic     []  Ice     []  heat  []  Ice massage Position:  Location:   10 plus 3 minute set up [x]  Vasopneumatic Device Pressure: [] lo [x] med [] hi   Temp: [] lo [x] med [] hi   [x] Skin assessment post-treatment:  [x]intact    []redness- no adverse reaction                                                                                 []redness - adverse reaction:      40 min Therapeutic Exercise:  [x] See flow sheet:   Rationale: increase ROM, increase strength and improve coordination to improve the patients ability to perform transfers, dress, tolerate prolonged standing      12 min Manual Therapy: (R) knee patellar mobes , STM to distal quad and medial hamstrings, CRH for knee HS/knee ext, grade III ext mob f/b PROM   Rationale: decrease pain, increase ROM and increase tissue extensibility to improve the patients ability to dress     NT min Gait Trainin feet x3 with SPC device on level surfaces with SBA and VCs for knee flexion during initial swing, TKE for proper heel strike and min knee flexion during midstance   Rationale: increase proprioception, improve balance strategies, increase strength to improve the patient's ability to ambulate with reduced fall risk                                                                      with TE min Patient Education: [x] Review HEP from IE      Other Objective/Functional Measures:               Knee AAROM: 8*   Added HS curls, and progressed prone hip extension with focus on QS and glute lift. Pain Level (0-10 scale) post treatment: 1/10     ASSESSMENT/Changes in Function:   Progressed with hip strengthening with focus on QS for ex for maintaining knee extension. Patient initially performing leg raises w/o QS making it appear to have a significant quad lag during leg raises; most challenged with hip extension in prone due to poor glute activation.      Patient will continue to benefit from skilled PT services to modify and progress therapeutic interventions, address functional mobility deficits, address ROM deficits, address strength deficits, analyze and address soft tissue restrictions, analyze and cue movement patterns, analyze and modify body mechanics/ergonomics, assess and modify postural abnormalities and address imbalance/dizziness to attain remaining goals. [x]  See Plan of Care  []  See progress note/recertification  []  See Discharge Summary         Progress towards goals / Updated goals:   · Short Term Goals: To be accomplished in 2  weeks:  1) Establish home exercise program. -Goal met; pt notes compliance (3/1/19)  2) Increase AROM in R knee to -[id number] degrees to facilitate ADLS and dressing/transfers.  -Goal progressing; pt demos 6-96 deg PROM in supine (3/1/19)  · Long Term Goals: To be accomplished in  4  weeks:  1) Patient  independent with HEP. 2) Patient will improve R knee AROM to 0-120 degrees to facilitate gait, transfers,driving. 3) Increase FOTO to 62 indicating improved function and quality of life. Charles Oxford 4) Patient will increase R knee strength in quad to 5 so patient has improved ability to descend steps reciprocal, and ambulate with TKE and normal gait.   5) Patient able to stand for >1hr without AD to progress towards return to work.     PLAN  [x]  Upgrade activities as tolerated     [x]  Continue plan of care  []  Update interventions per flow sheet       []  Discharge due to:_  []  Other:               Ward Gordon DPT 3/4/2019  8:42 AM

## 2019-03-07 ENCOUNTER — HOSPITAL ENCOUNTER (OUTPATIENT)
Dept: PHYSICAL THERAPY | Age: 59
Discharge: HOME | End: 2019-03-07
Payer: COMMERCIAL

## 2019-03-07 PROCEDURE — 97110 THERAPEUTIC EXERCISES: CPT

## 2019-03-07 PROCEDURE — 97016 VASOPNEUMATIC DEVICE THERAPY: CPT

## 2019-03-07 PROCEDURE — 97140 MANUAL THERAPY 1/> REGIONS: CPT

## 2019-03-07 NOTE — PROGRESS NOTES
PT DAILY TREATMENT NOTE     Patient Name: George Dubon  Date:3/7/2019  : 1960  [x]  Patient  Verified  Payor: Jas Moreno / [facility]: [facility] [employer]  [facility] / Product Type: Commerical /    In time: 8:31 am          Out time: 9:40 am  Total Treatment Time (min): 69  Visit #: 4 of     Treatment Area: Knee pain, right [M25.561]  Acute postoperative pain [G89.18]    SUBJECTIVE  Pain Level (0-10 scale): 3  Any medication changes, allergies to medications, adverse drug reactions, diagnosis change, or new procedure performed?: [x] No    [] Yes (see summary sheet for update)  Subjective functional status/changes:   [] No changes reported  \"I have been working on bending my knee when I walk. \"    OBJECTIVE  Modality rationale: decrease edema, decrease inflammation and decrease pain to improve the patients ability to > standing tolerance      Min Type Additional Details   10 [x]  Vasopneumatic Device Pressure: [] lo [x] med [] hi   Temp: [] lo [x] med [] hi   [x] Skin assessment post-treatment:  [x]intact    []redness- no adverse reaction                                                                                 []redness - adverse reaction:      46 min Therapeutic Exercise:  [x] See flow sheet:    Rationale: increase ROM, increase strength and improve coordination to improve the patients ability to perform transfers, dress, tolerate prolonged standing      13 min Manual Therapy: (R) knee patellar mobes , STM to distal quad and medial hamstrings, CRH for knee HS/knee ext, grade III ext mob f/b PROM   Rationale: decrease pain, increase ROM and increase tissue extensibility to improve the patients ability to dress     0 min Gait Training:  NT   Rationale: increase proprioception, improve balance strategies, increase strength to improve the patient's ability to ambulate with reduced fall risk                                                                      with TE min Patient Education: [x] Review HEP; discussed addition of self patellar mobs (inferior) to improve knee EXT      Other Objective/Functional Measures:                 Pain Level (0-10 scale) post treatment: 0     ASSESSMENT/Changes in Function:   Patient demos improved gait quality in clinic; would benefit from continued PT to improve knee EXT ROM and quad strength to normalize gait.     Patient will continue to benefit from skilled PT services to modify and progress therapeutic interventions, address functional mobility deficits, address ROM deficits, address strength deficits, analyze and address soft tissue restrictions, analyze and cue movement patterns, analyze and modify body mechanics/ergonomics, assess and modify postural abnormalities and address imbalance/dizziness to attain remaining goals. [x]  See Plan of Care  []  See progress note/recertification  []  See Discharge Summary         Progress towards goals / Updated goals: · Short Term Goals: To be accomplished in 2  weeks:  1) Establish home exercise program. -Goal met; pt notes compliance (3/1/19)  2) Increase AROM in R knee to -8pi162 degrees to facilitate ADLS and dressing/transfers.  -Goal progressing; pt demos 6-96 deg PROM in supine (3/1/19)  · Long Term Goals: To be accomplished in  4  weeks:  1) Patient  independent with HEP. 2) Patient will improve R knee AROM to 0-120 degrees to facilitate gait, transfers,driving. 3) Increase FOTO to 62 indicating improved function and quality of life. Charles Rosado 4) Patient will increase R knee strength in quad to 5 so patient has improved ability to descend steps reciprocal, and ambulate with TKE and normal gait.  -Goal progressing with pt cognizant of normalizing in clinic, limited by posterior chain tightness (3/7/19)  5) Patient able to stand for >1hr without AD to progress towards return to work.     PLAN  [x]  Upgrade activities as tolerated     [x]  Continue plan of care  []  Update interventions per flow sheet       []  Discharge due to:_  []  Other:_         Sindi Salgado, PTA 3/7/2019

## 2019-03-08 ENCOUNTER — HOSPITAL ENCOUNTER (OUTPATIENT)
Dept: PHYSICAL THERAPY | Age: 59
Discharge: HOME | End: 2019-03-08
Payer: COMMERCIAL

## 2019-03-08 PROCEDURE — 97016 VASOPNEUMATIC DEVICE THERAPY: CPT

## 2019-03-08 PROCEDURE — 97110 THERAPEUTIC EXERCISES: CPT

## 2019-03-08 PROCEDURE — 97140 MANUAL THERAPY 1/> REGIONS: CPT

## 2019-03-08 NOTE — PROGRESS NOTES
PT DAILY TREATMENT NOTE     Patient Name: Dennys Villegas  Date:3/8/2019  : 1960  [x]  Patient  Verified  Payor: Alejandro Shirley / Plan: [facility]  CAPITATED PT / Product Type: Commerical /    In time: 8:30am         Out time: 9:50 am  Total Treatment Time (min): 80  Visit #: 5 of     Treatment Area: Knee pain, right [M25.561]  Acute postoperative pain [G89.18]    SUBJECTIVE  Pain Level (0-10 scale): 3  Any medication changes, allergies to medications, adverse drug reactions, diagnosis change, or new procedure performed?: [x] No    [] Yes (see summary sheet for update)  Subjective functional status/changes:   [] No changes reported  \"I think I am moving better. \"    OBJECTIVE  Modality rationale: decrease edema, decrease inflammation and decrease pain to improve the patients ability to > standing tolerance      Min Type Additional Details   10 [x]  Vasopneumatic Device Pressure: [] lo [x] med [] hi   Temp: [] lo [x] med [] hi   [x] Skin assessment post-treatment:  [x]intact    []redness- no adverse reaction                                                                                 []redness - adverse reaction:      58 min Therapeutic Exercise:  [x] See flow sheet:   Rationale: increase ROM, increase strength and improve coordination to improve the patients ability to perform transfers, dress, tolerate prolonged standing      12 min Manual Therapy: (R) knee patellar mobes , STM to distal quad and medial hamstrings, CRH for knee HS/knee ext, grade III ext mob f/b PROM   Rationale: decrease pain, increase ROM and increase tissue extensibility to improve the patients ability to dress     0 min Gait Training:  NT   Rationale: increase proprioception, improve balance strategies, increase strength to improve the patient's ability to ambulate with reduced fall risk                                                                      with TE min Patient Education: [x] Review HEP      Other Objective/Functional Measures:               Knee AAROM: 5-98 deg    Pain Level (0-10 scale) post treatment: 3     ASSESSMENT/Changes in Function:   Slow progress with ROM gains.     Patient will continue to benefit from skilled PT services to modify and progress therapeutic interventions, address functional mobility deficits, address ROM deficits, address strength deficits, analyze and address soft tissue restrictions, analyze and cue movement patterns, analyze and modify body mechanics/ergonomics, assess and modify postural abnormalities and address imbalance/dizziness to attain remaining goals. [x]  See Plan of Care  []  See progress note/recertification  []  See Discharge Summary         Progress towards goals / Updated goals: · Short Term Goals: To be accomplished in 2  weeks:  1) Establish home exercise program. -Goal met; pt notes compliance (3/1/19)  2) Increase AROM in R knee to -6hw387 degrees to facilitate ADLS and dressing/transfers.  -Goal progressing; pt demos 5-98 deg AAROM in supine (3/8/19)  · Long Term Goals: To be accomplished in  4  weeks:  1) Patient  independent with HEP. 2) Patient will improve R knee AROM to 0-120 degrees to facilitate gait, transfers,driving. 3) Increase FOTO to 62 indicating improved function and quality of life. Heddie Hodges 4) Patient will increase R knee strength in quad to 5 so patient has improved ability to descend steps reciprocal, and ambulate with TKE and normal gait.  -Goal progressing with pt cognizant of normalizing in clinic, limited by posterior chain tightness (3/7/19)  5) Patient able to stand for >1hr without AD to progress towards return to work.     PLAN  [x]  Upgrade activities as tolerated     [x]  Continue plan of care  []  Update interventions per flow sheet       []  Discharge due to:_  []  Other:_         Sindi Salgado, PTA 3/8/2019

## 2019-03-11 ENCOUNTER — HOSPITAL ENCOUNTER (OUTPATIENT)
Dept: PHYSICAL THERAPY | Age: 59
Discharge: HOME | End: 2019-03-11
Payer: COMMERCIAL

## 2019-03-11 PROCEDURE — 97016 VASOPNEUMATIC DEVICE THERAPY: CPT

## 2019-03-11 PROCEDURE — 97140 MANUAL THERAPY 1/> REGIONS: CPT

## 2019-03-11 PROCEDURE — 97110 THERAPEUTIC EXERCISES: CPT

## 2019-03-11 NOTE — PROGRESS NOTES
PT DAILY TREATMENT NOTE     Patient Name: Maureen Blum  Date:3/11/2019  : 1960  [x]  Patient  Verified  Payor: Rasheed Hammonds / [facility] Product Type: Commerical /    In time:8:38  Out time:10:00  Total Treatment Time (min): 82  Total Timed Codes (min): 72  1:1 Treatment Time (min): 72   Visit #: 5 of     Treatment Area: Knee pain, right [M25.561]  Acute postoperative pain [G89.18]    SUBJECTIVE  Pain Level (0-10 scale): 4  Any medication changes, allergies to medications, adverse drug reactions, diagnosis change, or new procedure performed?: [x] No    [] Yes (see summary sheet for update)  Subjective functional status/changes:   [] No changes reported  Some stiffness today. Went out to lunch and then dinner with friends so it was lots of sitting.     Sleeping with fair comfort - awoke several times from stiffness (and from my cat)  Pt notes, \"It's still swollen around the joint, but no warmth and redness\"  Incision burning - doing scar massage with vitamin E      OBJECTIVE  Modality rationale: decrease edema, decrease inflammation and decrease pain to improve the patients ability to improve gait, stairs, ADLs   Min Type Additional Details   10 [x]  Vasopneumatic Device Pressure:       [] lo [x] med [] hi   Temperature: [x] lo [] med [] hi   [x] Skin assessment post-treatment:  [x]intact []redness- no adverse reaction       []redness - adverse reaction:       57 (billed 3) min Therapeutic Exercise:  [x] See flow sheet :    Rationale: increase ROM, increase strength, improve coordination and improve balance to improve the patients ability to ambulate, stairs, ADLs    15 min Manual Therapy:  (R) knee patellar mobes , STM to distal quad and medial hamstrings, CRH for knee HS/knee ext, grade III ext mob f/b PROM   Rationale: decrease pain, increase ROM, increase tissue extensibility and decrease edema  to improve gait, mobility           x min Patient Education: [x] Review HEP [] Progressed/Changed HEP based on:   [] positioning   [] body mechanics   [] transfers   [] heat/ice application       Review scar massage, sunblock during warm weather. Increased time spent on knee extension this week, propped up, prone and standing TKE for weighted knee extension to improve gait        Other Objective/Functional Measures:   AAROM R knee extension 10, PROM 7  PROM flexion 101      Pain Level (0-10 scale) post treatment: 2    ASSESSMENT/Changes in Function: Pt notes \"it feels looser now\" and she demo's ipmroved gait with progress towards achieving TKE. Pt aware of R hip ER compensations to avoid knee flexion and notes she will avoid them in favor of loading the knee. Min drainage at the distal end of the scar, clear and no sxs infection. Covered with gauze and bandage and pt notes steri strips just fell off that area this weekend and possible pulled off a scab. Patient will continue to benefit from skilled PT services to modify and progress therapeutic interventions, address functional mobility deficits, address ROM deficits, address strength deficits, analyze and address soft tissue restrictions, analyze and cue movement patterns, analyze and modify body mechanics/ergonomics, assess and modify postural abnormalities, address imbalance/dizziness and instruct in home and community integration to attain remaining goals. []  See Plan of Care  []  See progress note/recertification  []  See Discharge Summary         Progress towards goals / Updated goals: · Short Term Goals: To be accomplished in 2  weeks:  1) Establish home exercise program. -Goal met; pt notes compliance (3/1/19)  2) Increase AROM in R knee to -2jp548 degrees to facilitate ADLS and dressing/transfers.  -Goal progressing; pt demos -7 to 101 (3/11/19)  · Long Term Goals: To be accomplished in  4  weeks:  1) Patient  independent with HEP.   2) Patient will improve R knee AROM to 0-120 degrees to facilitate gait, transfers,driving. 3) Increase FOTO to 62 indicating improved function and quality of life. Becky Kinsey 4) Patient will increase R knee strength in quad to 5 so patient has improved ability to descend steps reciprocal, and ambulate with TKE and normal gait. -Goal progressing with pt cognizant of normalizing in clinic, limited by posterior chain tightness (3/7/19)  5) Patient able to stand for >1hr without AD to progress towards return to work.  Progressing tolerance, with slowly improving TKE (3/11/19)    PLAN  [x]  Upgrade activities as tolerated     []  Continue plan of care  []  Update interventions per flow sheet       []  Discharge due to:_  []  Other:_  con't to progress extension ROM>     Delgado Wallis, PT 3/11/2019  8:10 AM

## 2019-03-14 ENCOUNTER — HOSPITAL ENCOUNTER (OUTPATIENT)
Dept: PHYSICAL THERAPY | Age: 59
Discharge: HOME | End: 2019-03-14
Payer: COMMERCIAL

## 2019-03-14 PROCEDURE — 97110 THERAPEUTIC EXERCISES: CPT | Performed by: PHYSICAL THERAPIST

## 2019-03-14 PROCEDURE — 97140 MANUAL THERAPY 1/> REGIONS: CPT | Performed by: PHYSICAL THERAPIST

## 2019-03-14 NOTE — PROGRESS NOTES
PT DAILY TREATMENT NOTE     Patient Name: Justino Homans  Date:3/14/2019  : 1960  [x]  Patient  Verified  Payor: Clifford Veras / Plan: [facility] [employer]  CAPITATED PT / Product Type: Commerical /    In time:8:30  Out time:9:51  Total Treatment Time (min): 80  Total Timed Codes (min): 70  1:1 Treatment Time (min): NA   Visit #: 6 of     Treatment Area: Knee pain, right [M25.561]  Acute postoperative pain [G89.18]    SUBJECTIVE  Pain Level (0-10 scale): 3/10 \"stiff\"  Any medication changes, allergies to medications, adverse drug reactions, diagnosis change, or new procedure performed?: [x] No    [] Yes (see summary sheet for update)  Subjective functional status/changes:   [] No changes reported  States that she is just very stiff this AM, as usual. States tried heating prior to her appointment. States still having the burning sensation to the side of her knee.      OBJECTIVE         Modality rationale: decrease edema, decrease inflammation and decrease pain to improve the patients ability to improve gait, stairs, ADLs   Min Type Additional Details    NT [x]  Vasopneumatic Device Pressure:       [] lo [x] med [] hi   Temperature: [x] lo [] med [] hi         min [] Estim, type/location:                                      []  att     []  unatt     []  w/US     []  w/ice    []  w/heat    min []  Mechanical Traction: type/lbs                   []  pro   []  sup   []  int   []  cont    []  before manual    []  after manual    min []  Ultrasound, settings/location:      min []  Iontophoresis w/ dexamethasone, location:                                               []  take home patch       []  in clinic   10 min []  Ice     [x]  Heat    location/position: Back of right knee in long sitting         [x]Skin assessment post-treatment:  [x]intact []redness- no adverse reaction       []redness - adverse reaction:         45 min Therapeutic Exercise:  [x] See flow sheet :    Rationale: increase ROM, increase strength, improve coordination and improve balance to improve the patients ability to ambulate, stairs, ADLs     15  min Manual Therapy:  (R) knee patellar mobes , STM/DTM to distal HS and popliteus, grade III ext mob f/b PROM, gentle scar massage avoiding distal end of scar   Rationale: decrease pain, increase ROM, increase tissue extensibility and decrease edema  to improve gait, mobility                                                                                 x min Patient Education: [x] Review HEP    [] Progressed/Changed HEP based on:   [] positioning   [] body mechanics   [] transfers   [] heat/ice application             Other Objective/Functional Measures: Active knee extension lacking 7 degrees        Pain Level (0-10 scale) post treatment: 0/10     ASSESSMENT/Changes in Function: Primary focus on ROM today with improved active knee extension by end of session. Patient continues to demonstrate significant ROM loss however is making slow steady gains. Patient also instructed to trial sitting extension prop using a bookbag or purse with a tolerable weight versus prone extension hang to decrease hip compensation.          Patient will continue to benefit from skilled PT services to modify and progress therapeutic interventions, address functional mobility deficits, address ROM deficits, address strength deficits, analyze and address soft tissue restrictions, analyze and cue movement patterns, analyze and modify body mechanics/ergonomics, assess and modify postural abnormalities, address imbalance/dizziness and instruct in home and community integration to attain remaining goals. []  See Plan of Care  []  See progress note/recertification  []  See Discharge Summary         Progress towards goals / Updated goals: · Short Term Goals:  To be accomplished in 2  weeks:  1) Establish home exercise program. -Goal met; pt notes compliance (3/1/19)  2) Increase AROM in R knee to -[id number] degrees to facilitate ADLS and dressing/transfers.  -Goal progressing; pt demos -7 to 101 (3/14/19)  · Long Term Goals: To be accomplished in  4  weeks:  1) Patient  independent with HEP. 2) Patient will improve R knee AROM to 0-120 degrees to facilitate gait, transfers,driving. 3) Increase FOTO to 62 indicating improved function and quality of life. Conway Dials 4) Patient will increase R knee strength in quad to 5 so patient has improved ability to descend steps reciprocal, and ambulate with TKE and normal gait. -Goal progressing with pt cognizant of normalizing in clinic, limited by posterior chain tightness (3/7/19)  5) Patient able to stand for >1hr without AD to progress towards return to work.  Progressing tolerance, with slowly improving TKE (3/11/19)     PLAN  [x]  Upgrade activities as tolerated     [x]  Continue plan of care  []  Update interventions per flow sheet       []  Discharge due to:_  []  Other:_  con't to progress extension ROM>              Kayleigh Kovacs DPT 3/14/2019  7:26 AM

## 2019-03-15 ENCOUNTER — HOSPITAL ENCOUNTER (OUTPATIENT)
Dept: PHYSICAL THERAPY | Age: 59
Discharge: HOME | End: 2019-03-15
Payer: COMMERCIAL

## 2019-03-15 PROCEDURE — 97140 MANUAL THERAPY 1/> REGIONS: CPT

## 2019-03-15 PROCEDURE — 97110 THERAPEUTIC EXERCISES: CPT

## 2019-03-15 NOTE — PROGRESS NOTES
PT DAILY TREATMENT NOTE     Patient Name: Rufino Monroy  Date:3/15/2019  : 1960  [x]  Patient  Verified  Payor: Rahat Whatley / Plan: [address] [place] [address] Rd PT / Product Type: Commerical /    In time: 8:26 am        Out time: 9:28 am  Total Treatment Time (min): 62  Visit #: 8 of     Treatment Area: Knee pain, right [M25.561]  Acute postoperative pain [G89.18]    SUBJECTIVE  Pain Level (0-10 scale): 2  Any medication changes, allergies to medications, adverse drug reactions, diagnosis change, or new procedure performed?: [x] No    [] Yes (see summary sheet for update)  Subjective functional status/changes:   [] No changes reported  \"It is always stiff in the morning, but I have been working it. \"    OBJECTIVE         Modality rationale: decrease edema, decrease inflammation and decrease pain to improve the patients ability to improve gait, stairs, ADLs   Min Type Additional Details    NT [x]  Vasopneumatic Device Pressure:       [] lo [x] med [] hi   Temperature: [x] lo [] med [] hi         min [] Estim, type/location:                                      []  att     []  unatt     []  w/US     []  w/ice    []  w/heat    min []  Mechanical Traction: type/lbs                   []  pro   []  sup   []  int   []  cont    []  before manual    []  after manual    min []  Ultrasound, settings/location:      min []  Iontophoresis w/ dexamethasone, location:                                               []  take home patch       []  in clinic   10 min [x]  Ice (anterior)    [x]  Heat (posterior) Right knee in long sitting         [x]Skin assessment post-treatment:  [x]intact []redness- no adverse reaction       []redness - adverse reaction:         37 min Therapeutic Exercise:  [x] See flow sheet :    Rationale: increase ROM, increase strength, improve coordination and improve balance to improve the patients ability to ambulate, stairs, ADLs     15 min Manual Therapy:  (R) knee patellar mobes , STM/DTM to distal HS and popliteus, grade III ext mob f/b PROM, manual OP for extension   Rationale: decrease pain, increase ROM, increase tissue extensibility and decrease edema  to improve gait, mobility                                                                                 X min Patient Education: [x] Review HEP; addition of supine EXT hang with weight on knee to improve extension; reviewed RICE following treatment today          Other Objective/Functional Measures:          Knee EXT AROM, pre-TX / post-MT and stretchin deg / 6 deg     Pain Level (0-10 scale) post treatment: 1     ASSESSMENT/Changes in Function:   Patient with significant improvement in knee EXT mobility today following manual OP; patient agreeable to aggressive manual to improve mobility as she would like to return to normalize gait ASAP. Focused on gaining EXT in clinic today with (+) results.     Patient will continue to benefit from skilled PT services to modify and progress therapeutic interventions, address functional mobility deficits, address ROM deficits, address strength deficits, analyze and address soft tissue restrictions, analyze and cue movement patterns, analyze and modify body mechanics/ergonomics, assess and modify postural abnormalities, address imbalance/dizziness and instruct in home and community integration to attain remaining goals. []  See Plan of Care  []  See progress note/recertification  []  See Discharge Summary         Progress towards goals / Updated goals: · Short Term Goals: To be accomplished in 2  weeks:  1) Establish home exercise program. -Goal met; pt notes compliance (3/1/19)  2) Increase AROM in R knee to -[id number] degrees to facilitate ADLS and dressing/transfers.  -Goal progressing; pt demos -7 to 101 (3/14/19)  · Long Term Goals: To be accomplished in  4  weeks:  1) Patient  independent with HEP.   2) Patient will improve R knee AROM to 0-120 degrees to facilitate gait, transfers,driving.  -Goal progressing; 6 deg active extension (3/15/19)  3) Increase FOTO to 62 indicating improved function and quality of life. Paty Torrez 4) Patient will increase R knee strength in quad to 5 so patient has improved ability to descend steps reciprocal, and ambulate with TKE and normal gait. -Goal progressing with pt cognizant of normalizing in clinic, limited by posterior chain tightness (3/7/19)  5) Patient able to stand for >1hr without AD to progress towards return to work.  Progressing tolerance, with slowly improving TKE (3/11/19)     PLAN  [x]  Upgrade activities as tolerated     [x]  Continue plan of care  []  Update interventions per flow sheet       []  Discharge due to:_  [x]  Other: assess response to aggressive manual    Estrdaa Miller, PTA 3/15/2019

## 2019-03-18 ENCOUNTER — HOSPITAL ENCOUNTER (OUTPATIENT)
Dept: PHYSICAL THERAPY | Age: 59
Discharge: HOME | End: 2019-03-18
Payer: COMMERCIAL

## 2019-03-18 PROCEDURE — 97110 THERAPEUTIC EXERCISES: CPT

## 2019-03-18 PROCEDURE — 97016 VASOPNEUMATIC DEVICE THERAPY: CPT

## 2019-03-18 PROCEDURE — 97140 MANUAL THERAPY 1/> REGIONS: CPT

## 2019-03-18 NOTE — PROGRESS NOTES
PT DAILY TREATMENT NOTE     Patient Name: Fran Goldmann  Date:3/18/2019  : 1960  [x]  Patient  Verified  Payor: Tato Murphy / Plan: [employer]  CAPITATED PT / Product Type: Commerical /    In time:8:30  Out time:9:45  Total Treatment Time (min): 75  Total Timed Codes (min): 65  1:1 Treatment Time (min): 65   Visit #: 9 of     Treatment Area: Knee pain, right [M25.561]  Acute postoperative pain [G89.18]    SUBJECTIVE  Pain Level (0-10 scale): 3  Any medication changes, allergies to medications, adverse drug reactions, diagnosis change, or new procedure performed?: [x] No    [] Yes (see summary sheet for update)  Subjective functional status/changes:   [] No changes reported  I can stand on it without pain but the burning is driving me crazy. I can't sleep at night and I can't keep a pants leg on it.       OBJECTIVE  Modality rationale: decrease edema, decrease inflammation, decrease pain and increase tissue extensibility to improve the patients ability to ambulate, stairs , squat   Min Type Additional Details    [] Estim: []Att   []Unatt        []TENS instruct                  []IFC  []Premod   []NMES                     []Other:  []w/US   []w/ice   []w/heat  Position:  Location:    []  Traction: [] Cervical       []Lumbar                       [] Prone          []Supine                       []Intermittent   []Continuous Lbs:  [] before manual  [] after manual    []  Ultrasound: []Continuous   [] Pulsed                           []1MHz   []3MHz Location:  W/cm2:    []  Iontophoresis with dexamethasone         Location: [] Take home patch   [] In clinic    [x]  Ice anterior     [x]  Heat posterior   []  Ice massage Position: long sitting  Location: R knee   10 [x]  Vasopneumatic Device Pressure:       [] lo [x] med [] hi   Temperature: [x] lo [] med [] hi   [x] Skin assessment post-treatment:  [x]intact []redness- no adverse reaction       []redness - adverse reaction:       45 min Therapeutic Exercise:  [x] See flow sheet : progressed SLR weight    Rationale: increase ROM, increase strength and improve coordination to improve the patients ability to ambulate, stairs, drive, dressing    20 min Manual Therapy:  (R) knee patellar mobes , STM/DTM to distal HS and popliteus, grade III ext mob f/b PROM, manual OP for extension    R upslip and R posterior innom MET  = pt notes congenital hx of scoliosis and we were not able to fully correct pelvic obliquity    Rationale: decrease pain, increase ROM and increase tissue extensibility to improve movement for gait, ADLs, stairs             x min Patient Education: [x] Review HEP    [] Progressed/Changed HEP based on:     con't to sit with R knee in extension and weights over knee to tolerance  Discussed swelling prognosis for 6-12 months post-op    [] positioning   [] body mechanics   [] transfers   [] heat/ice application        Other Objective/Functional Measures:   Pt able to achieve full retro Ute on the bike today. Achieves full fwd Ute but with hip compensations    VC required to keep heel down during gastroc stretch and to avoid bouncing with knee flexion step stretch. AROM R knee extension -9, PROM -6  AAROM R knee flexion: 101,  105    Pt with no quad lag during SLR (in available ROM)     Pain Level (0-10 scale) post treatment: 0    ASSESSMENT/Changes in Function: slowly progressing ROM. Pt con' with min pitting edema. No sxs infection. Incision fully healed. Notes good HS stretch felt in prone although discussed hip compensations from that position.      Patient will continue to benefit from skilled PT services to modify and progress therapeutic interventions, address functional mobility deficits, address ROM deficits, address strength deficits, analyze and address soft tissue restrictions, analyze and cue movement patterns, analyze and modify body mechanics/ergonomics, assess and modify postural abnormalities, address imbalance/dizziness and instruct in home and community integration to attain remaining goals. []  See Plan of Care  []  See progress note/recertification  []  See Discharge Summary         Progress towards goals / Updated goals: · Short Term Goals: To be accomplished in 2  weeks:  1) Establish home exercise program. -Goal met; pt notes compliance (3/1/19)  2) Increase AROM in R knee to -5 to100 degrees to facilitate ADLS and dressing/transfers.  -Goal progressing; pt demos -6 to 105 PROM (3/18/19)  · Long Term Goals: To be accomplished in  4  weeks:  1) Patient  independent with HEP. 2) Patient will improve R knee AROM to 0-120 degrees to facilitate gait, transfers,driving.  -Goal progressing; 6 deg active extension (3/15/19)  3) Increase FOTO to 62 indicating improved function and quality of life. Michaelyn Daily 4) Patient will increase R knee strength in quad to 5 so patient has improved ability to descend steps reciprocal, and ambulate with TKE and normal gait. -Goal progressing with pt cognizant of normalizing in clinic, limited by posterior chain tightness (3/7/19)  5) Patient able to stand for >1hr without AD to progress towards return to work. Progressing with relatively unlimited tolerance to standing however lacking TKE (3/18/19)    PLAN  [x]  Upgrade activities as tolerated     []  Continue plan of care  []  Update interventions per flow sheet       []  Discharge due to:_  [x]  Other:_  Pt to see MD on 3/26/19; PN due at that time. Con't aggressive MT aimed at Extension > Flexion.      King Johnson, PT 3/18/2019  7:55 AM

## 2019-03-21 ENCOUNTER — HOSPITAL ENCOUNTER (OUTPATIENT)
Dept: PHYSICAL THERAPY | Age: 59
Discharge: HOME | End: 2019-03-21
Payer: COMMERCIAL

## 2019-03-21 PROCEDURE — 97016 VASOPNEUMATIC DEVICE THERAPY: CPT

## 2019-03-21 PROCEDURE — 97110 THERAPEUTIC EXERCISES: CPT

## 2019-03-21 PROCEDURE — 97140 MANUAL THERAPY 1/> REGIONS: CPT

## 2019-03-21 NOTE — PROGRESS NOTES
PT DAILY TREATMENT NOTE     Patient Name: Tram Mazariegos  Date:3/21/2019  : 1960  [x]  Patient  Verified  Payor: Phan Rather / Plan: [employer]  CAPITATED PT / Product Type: Commerical /    In time: 8:30 am              Out time: 9:38 am  Total Treatment Time (min): 68  Visit #: 10 of     Treatment Area: Knee pain, right [M25.561]  Acute postoperative pain [G89.18]    SUBJECTIVE  Pain Level (0-10 scale): 3  Any medication changes, allergies to medications, adverse drug reactions, diagnosis change, or new procedure performed?: [x] No    [] Yes (see summary sheet for update)  Subjective functional status/changes:   [] No changes reported  \"I really want to get my knee straight. I don't think my hips will ever level itself since it has been off for so long. \"    OBJECTIVE  Modality rationale: decrease edema, decrease inflammation, decrease pain and increase tissue extensibility to improve the patients ability to ambulate, stairs , squat   Min Type Additional Details    [] Estim: []Att   []Unatt        []TENS instruct                  []IFC  []Premod   []NMES                     []Other:  []w/US   []w/ice   []w/heat  Position:  Location:    []  Traction: [] Cervical       []Lumbar                       [] Prone          []Supine                       []Intermittent   []Continuous Lbs:  [] before manual  [] after manual    []  Ultrasound: []Continuous   [] Pulsed                           []1MHz   []3MHz Location:  W/cm2:    []  Iontophoresis with dexamethasone         Location: [] Take home patch   [] In clinic    []  Ice anterior     []  Heat posterior   []  Ice massage Position:   Location:   10 [x]  Vasopneumatic Device Pressure:       [] lo [x] med [] hi   Temperature: [x] lo [] med [] hi   [x] Skin assessment post-treatment:  [x]intact []redness- no adverse reaction       []redness - adverse reaction:       38 min Therapeutic Exercise:  [x] See flow sheet:    Rationale: increase ROM, increase strength and improve coordination to improve the patients ability to ambulate, stairs, drive, dressing    20 min Manual Therapy:  (R) knee patellar mobes , STM/DTM to distal HS and popliteus, grade III ext mob f/b PROM, manual OP for extension   Rationale: decrease pain, increase ROM and increase tissue extensibility to improve movement for gait, ADLs, stairs             X min Patient Education: [x] Review HEP; modified cane height for proper positioning     Other Objective/Functional Measures:   Knee strength: ext lacking full TKE pre-MT, flex 4+/5  (R) knee AROM extension: 0 deg after aggressive manual OP - pt notes \"good stretch\"  (L) knee AROM extension, for reference: -5 deg into hyperextension    Pain Level (0-10 scale) post treatment: 2    ASSESSMENT/Changes in Function:   Patient requested aggressive manual today as she is intent on being able to straighten her knee fully to return to recreational activities. Able to achieve full knee extension with aggressive manual and with good tolerance. Medial hamstring tightness. Patient will continue to benefit from skilled PT services to modify and progress therapeutic interventions, address functional mobility deficits, address ROM deficits, address strength deficits, analyze and address soft tissue restrictions, analyze and cue movement patterns, analyze and modify body mechanics/ergonomics, assess and modify postural abnormalities, address imbalance/dizziness and instruct in home and community integration to attain remaining goals. []  See Plan of Care  []  See progress note/recertification  []  See Discharge Summary         Progress towards goals / Updated goals: · Short Term Goals: To be accomplished in 2  weeks:  1) Establish home exercise program. -Goal met; pt notes compliance (3/1/19)  2) Increase AROM in R knee to -5 to100 degrees to facilitate ADLS and dressing/transfers.  -Goal progressing; pt demos -6 to 105 PROM (3/18/19)  · Long Term Goals:  To be accomplished in  4  weeks:  1) Patient  independent with HEP. 2) Patient will improve R knee AROM to 0-120 degrees to facilitate gait, transfers,driving.  -Goal progressing; 6 deg active extension (3/15/19)  3) Increase FOTO to 62 indicating improved function and quality of life. Paty Torrez 4) Patient will increase R knee strength in quad to 5 so patient has improved ability to descend steps reciprocal, and ambulate with TKE and normal gait. -Goal progressing with pt cognizant of normalizing in clinic, limited by posterior chain tightness (3/7/19)  5) Patient able to stand for >1hr without AD to progress towards return to work. Progressing with relatively unlimited tolerance to standing however lacking TKE (3/18/19)    PLAN  [x]  Upgrade activities as tolerated     [x]  Continue plan of care  []  Update interventions per flow sheet       []  Discharge due to:_  [x]  Other:_  Pt to see MD on 3/26/19; PN due at that time. Con't aggressive MT aimed at Extension > Flexion.       Estrada Miller, PTA  3/21/2019

## 2019-03-22 ENCOUNTER — HOSPITAL ENCOUNTER (OUTPATIENT)
Dept: PHYSICAL THERAPY | Age: 59
Discharge: HOME | End: 2019-03-22
Payer: COMMERCIAL

## 2019-03-22 PROCEDURE — 97110 THERAPEUTIC EXERCISES: CPT

## 2019-03-22 PROCEDURE — 97016 VASOPNEUMATIC DEVICE THERAPY: CPT

## 2019-03-22 PROCEDURE — 97140 MANUAL THERAPY 1/> REGIONS: CPT

## 2019-03-22 PROCEDURE — 97116 GAIT TRAINING THERAPY: CPT

## 2019-03-22 NOTE — PROGRESS NOTES
PT DAILY TREATMENT NOTE     Patient Name: Kristy Browne  Date:3/22/2019  : 1960  [x]  Patient  Verified  Payor: Rosana Guardado / Plan: [employer]  CAPITATED PT / Product Type: Commerical /    In time: 8:30 am              Out time: 9:38 am  Total Treatment Time (min): 68  Visit #: 11 of     Treatment Area: Knee pain, right [M25.561]  Acute postoperative pain [G89.18]    SUBJECTIVE  Pain Level (0-10 scale): 3  Any medication changes, allergies to medications, adverse drug reactions, diagnosis change, or new procedure performed?: [x] No    [] Yes (see summary sheet for update)  Subjective functional status/changes:   [] No changes reported  \"I think I am getting the knee straighter. \"    OBJECTIVE  Modality rationale: decrease edema, decrease inflammation, decrease pain and increase tissue extensibility to improve the patients ability to ambulate, stairs , squat   Min Type Additional Details    [] Estim: []Att   []Unatt        []TENS instruct                  []IFC  []Premod   []NMES                     []Other:  []w/US   []w/ice   []w/heat  Position:  Location:    []  Traction: [] Cervical       []Lumbar                       [] Prone          []Supine                       []Intermittent   []Continuous Lbs:  [] before manual  [] after manual    []  Ultrasound: []Continuous   [] Pulsed                           []1MHz   []3MHz Location:  W/cm2:    []  Iontophoresis with dexamethasone         Location: [] Take home patch   [] In clinic    []  Ice anterior     []  Heat posterior   []  Ice massage Position:   Location:   10 [x]  Vasopneumatic Device Pressure:       [] lo [x] med [] hi   Temperature: [x] lo [] med [] hi   [x] Skin assessment post-treatment:  [x]intact []redness- no adverse reaction       []redness - adverse reaction:       37 min Therapeutic Exercise:  [x] See flow sheet:   Rationale: increase ROM, increase strength and improve coordination to improve the patients ability to ambulate, stairs, drive, dressing    12 min Manual Therapy:  (R) knee patellar mobes , STM/DTM to distal HS and popliteus, calf; grade III ext mob f/b PROM, manual OP for extension   Rationale: decrease pain, increase ROM and increase tissue extensibility to improve movement for gait, ADLs, stairs     9 min Gait Trainin feet x 4 with SPC device on level surfaces with SBA and VCs for knee flexion during initial swing, TKE for proper heel strike and min knee flexion during midstance   Rationale: increase proprioception, improve balance strategies, increase strength to improve the patient's ability to ambulate with reduced fall risk          X min Patient Education: [x] Review HEP     Other Objective/Functional Measures:   (-) quad lag with SLR without available ROM  Knee EXT PROM: 3 deg from neutral    Pain Level (0-10 scale) post treatment: 1-2    ASSESSMENT/Changes in Function:   Patient states she has been performing her HEP strictly to improve mobility and return to PLOF; notes stiffness and inability to fully extend her knee prior to surgery. Improved gait quality in clinic with training and practice. Patient requested aggressive manual to improve ROM, but unable to achieve full EXT as last tx. Minimal pitting edema in the lower LE. Patient will continue to benefit from skilled PT services to modify and progress therapeutic interventions, address functional mobility deficits, address ROM deficits, address strength deficits, analyze and address soft tissue restrictions, analyze and cue movement patterns, analyze and modify body mechanics/ergonomics, assess and modify postural abnormalities, address imbalance/dizziness and instruct in home and community integration to attain remaining goals. []  See Plan of Care  []  See progress note/recertification  []  See Discharge Summary         Progress towards goals / Updated goals: · Short Term Goals:  To be accomplished in 2  weeks:  1) Establish home exercise program. -Goal met; pt notes compliance (3/1/19)  2) Increase AROM in R knee to -5 to100 degrees to facilitate ADLS and dressing/transfers.  -Goal progressing; pt demos -6 to 105 PROM (3/18/19)  · Long Term Goals: To be accomplished in  4  weeks:  1) Patient  independent with HEP. 2) Patient will improve R knee AROM to 0-120 degrees to facilitate gait, transfers,driving.  -Goal progressing; 6 deg active extension (3/15/19)  3) Increase FOTO to 62 indicating improved function and quality of life. Michaelyn Daily 4) Patient will increase R knee strength in quad to 5 so patient has improved ability to descend steps reciprocal, and ambulate with TKE and normal gait. -Goal progressing with pt cognizant of normalizing in clinic, limited by posterior chain tightness (3/7/19)  5) Patient able to stand for >1hr without AD to progress towards return to work. Progressing with relatively unlimited tolerance to standing however lacking TKE (3/18/19)    PLAN  [x]  Upgrade activities as tolerated     [x]  Continue plan of care  []  Update interventions per flow sheet       []  Discharge due to:_  [x]  Other:_  Pt to see MD on 3/26/19; PN due at that time. Con't aggressive MT aimed at Extension > Flexion.       Delmar Meckel, PTA  3/22/2019

## 2019-03-25 ENCOUNTER — HOSPITAL ENCOUNTER (OUTPATIENT)
Dept: PHYSICAL THERAPY | Age: 59
Discharge: HOME | End: 2019-03-25
Payer: COMMERCIAL

## 2019-03-25 PROCEDURE — 97140 MANUAL THERAPY 1/> REGIONS: CPT

## 2019-03-25 PROCEDURE — 97110 THERAPEUTIC EXERCISES: CPT

## 2019-03-25 NOTE — PROGRESS NOTES
[address] [facility] AT 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 DOS . Treatment has included aggressive stretching and manual treatment and VASO ice machine for swelling. CURRENT STATUS  Patient making slow, steady gains with PT. Patient reports having extension and flexion contractures prior to going into surgery, therefore battling shortened soft tissue, but progressing well. 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 -Goal met; pt notes compliance (3/1/19)  2) Increase AROM in R knee to -5 to100 degrees to facilitate ADLS and dressing/transfers.  -Goal progressing; 5-95  · Long Term Goals: To be accomplished in  4  weeks:  1) Patient  independent with HEP.  - goal progressing with progressive HEP   2) Patient will improve R knee AROM to 0-120 degrees to facilitate gait, transfers,driving.  -Goal progressing 5-95   3) Increase FOTO to 62 indicating improved function and quality of life. . Goal to be assessed at next PN   4) Patient will increase R knee strength in quad to 5 so patient has improved ability to descend steps reciprocal, and ambulate with TKE and normal gait. -Goal progressing at 4/5  5) Patient able to stand for >1hr without AD to progress towards return to work. goal progressing - patient reports good standing tolerance and will RTW mat 1st after FMLA expires     New Goals to be achieved in __12__  treatments:  Cont per LTG as below     RECOMMENDATIONS  Patient would benefit from continuation of skilled PT to address above mentioned deficits and improved ROM and strength to progress to PLOF and return to work. If you have any questions/comments please contact us directly at [phone]   Thank you for allowing us to assist in the care of your patient. Therapist Signature: Michelle Caraballo PT Date: 3/25/2019     Time: 954am    NOTE TO PHYSICIAN:  PLEASE COMPLETE THE ORDERS BELOW AND FAX TO   [facility]: [phone]. If you are unable to process this request in 24 hours please contact our office: [phone].  ___ I have read the above report and request that my patient continue as recommended.   ___ I have read the above report and request that my patient continue therapy with the following changes/special instructions:_________________________________________________________   ___ I have read the above report and request that my patient be discharged from therapy.      Physician Signature:        Date:       Time:

## 2019-03-25 NOTE — PROGRESS NOTES
PT DAILY TREATMENT NOTE     Patient Name: Angela Solano  Date:3/25/2019  : 1960  [x]  Patient  Verified  Payor: Nenita Peters / Plan: [employer]  CAPITATED PT / Product Type: Commerical /    In time: 659     Out time: 789  Total Treatment Time (min): 80  Visit #: 12 of     Treatment Area: Knee pain, right [M25.561]  Acute postoperative pain [G89.18]    SUBJECTIVE  Pain Level (0-10 scale): 3  Any medication changes, allergies to medications, adverse drug reactions, diagnosis change, or new procedure performed?: [x] No    [] Yes (see summary sheet for update)  Subjective functional status/changes:   [] No changes reported  \"I want to drive! \"    OBJECTIVE  Modality rationale: decrease edema, decrease inflammation, decrease pain and increase tissue extensibility to improve the patients ability to ambulate, stairs , squat   Min Type Additional Details    [] Estim: []Att   []Unatt        []TENS instruct                  []IFC  []Premod   []NMES                     []Other:  []w/US   []w/ice   []w/heat  Position:  Location:    []  Traction: [] Cervical       []Lumbar                       [] Prone          []Supine                       []Intermittent   []Continuous Lbs:  [] before manual  [] after manual    []  Ultrasound: []Continuous   [] Pulsed                           []1MHz   []3MHz Location:  W/cm2:    []  Iontophoresis with dexamethasone         Location: [] Take home patch   [] In clinic   10 [x]  Ice     []  Heat posterior   []  Ice massage Position: long sit   Location: R knee    In use [x]  Vasopneumatic Device Pressure:       [] lo [x] med [] hi   Temperature: [x] lo [] med [] hi   [x] Skin assessment post-treatment:  [x]intact []redness- no adverse reaction       []redness - adverse reaction:       57 min Therapeutic Exercise:  [x] See flow sheet: Reassess    Rationale: increase ROM, increase strength and improve coordination to improve the patients ability to ambulate, stairs, drive, dressing    13 min Manual Therapy:prone posterior chain rolling, prone extension OP, prone PNF CR for ext, supine knee flexion PROM with OP    Rationale: decrease pain, increase ROM and increase tissue extensibility to improve movement for gait, ADLs, stairs     NT min Gait Trainin feet x 4 with SPC device on level surfaces with SBA and VCs for knee flexion during initial swing, TKE for proper heel strike and min knee flexion during midstance   Rationale: increase proprioception, improve balance strategies, increase strength to improve the patient's ability to ambulate with reduced fall risk          X min Patient Education: [x] Review HEP     Other Objective/Functional Measures:   Goals assessed for PN  Pain at best 0/10, at worst 6/10 - burning sensation   Subjective % improvement 80%  Objective:    Gait no AD for household distances -= decreased heel srike    AROM 5-95   PROM 3-105   Knee strength 4/5 extension , 4/5 flexion    Hip strength flexion - 5/5, ABD 4/5 ext 4/5    Edema 39 R, 36.5 L   Improvements: min joint pain, amb in the home without AD, no fear of falling, general  ADLs  Deficits SPC for community use, Single leg balance, stair negotiation - step to pattern, [employer]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, driving, rec activities: ice dancing, tap dance      Pain Level (0-10 scale) post treatment: 1-2/1-    ASSESSMENT/Changes in Function:   SEE Pn     Patient will continue to benefit from skilled PT services to modify and progress therapeutic interventions, address functional mobility deficits, address ROM deficits, address strength deficits, analyze and address soft tissue restrictions, analyze and cue movement patterns, analyze and modify body mechanics/ergonomics, assess and modify postural abnormalities, address imbalance/dizziness and instruct in home and community integration to attain remaining goals.      []  See Plan of Care  []  See progress note/recertification  []  See Discharge Summary         Progress towards goals / Updated goals: · Short Term Goals: To be accomplished in 2  weeks:  1) Establish home exercise program. -Goal met; pt notes compliance (3/1/19)  2) Increase AROM in R knee to -5 to100 degrees to facilitate ADLS and dressing/transfers.  -Goal progressing; 5-95  · Long Term Goals: To be accomplished in  4  weeks:  1) Patient  independent with HEP. - goal progressing with progressive HEP   2) Patient will improve R knee AROM to 0-120 degrees to facilitate gait, transfers,driving.  -Goal progressing 5-95   3) Increase FOTO to 62 indicating improved function and quality of life. . Goal to be assessed at next PN   4) Patient will increase R knee strength in quad to 5 so patient has improved ability to descend steps reciprocal, and ambulate with TKE and normal gait. -Goal progressing at 4/5  5) Patient able to stand for >1hr without AD to progress towards return to work. goal progressing - patient reports good standing tolerance and will RTW mat 1st after FMLA expires     PLAN  [x]  Upgrade activities as tolerated     [x]  Continue plan of care  []  Update interventions per flow sheet       []  Discharge due to:_  [x]  Other:_  SEE PN for continuation     [place], PT  3/25/2019

## 2019-03-27 ENCOUNTER — HOSPITAL ENCOUNTER (OUTPATIENT)
Dept: PHYSICAL THERAPY | Age: 59
Discharge: HOME | End: 2019-03-27
Payer: COMMERCIAL

## 2019-03-27 PROCEDURE — 97110 THERAPEUTIC EXERCISES: CPT

## 2019-03-27 PROCEDURE — 97140 MANUAL THERAPY 1/> REGIONS: CPT

## 2019-03-27 PROCEDURE — 97016 VASOPNEUMATIC DEVICE THERAPY: CPT

## 2019-03-27 NOTE — PROGRESS NOTES
PT DAILY TREATMENT NOTE     Patient Name: Ashlee Blair  Date:3/27/2019  : 1960  [x]  Patient  Verified  Payor: Heladio Esqueda / Plan: [employer]  CAPITATED PT / Product Type: Commerical /    In time: 10:20 am       Out time: 11:33 am  Total Treatment Time (min): 73  Visit #: 1 of     Treatment Area: Knee pain, right [M25.561]  Acute postoperative pain [G89.18]    SUBJECTIVE  Pain Level (0-10 scale): 3  Any medication changes, allergies to medications, adverse drug reactions, diagnosis change, or new procedure performed?: [x] No    [] Yes (see summary sheet for update)  Subjective functional status/changes:   [] No changes reported  \"(*The doctor) was really happy. He said I was passed where I was going in (*to the surgery*). He gave me new orders. \"    OBJECTIVE  Modality rationale: decrease edema, decrease inflammation, decrease pain and increase tissue extensibility to improve the patients ability to ambulate, stairs , squat   Min Type Additional Details    [] Estim: []Att   []Unatt        []TENS instruct                  []IFC  []Premod   []NMES                     []Other:  []w/US   []w/ice   []w/heat  Position:  Location:    []  Traction: [] Cervical       []Lumbar                       [] Prone          []Supine                       []Intermittent   []Continuous Lbs:  [] before manual  [] after manual    []  Ultrasound: []Continuous   [] Pulsed                           []1MHz   []3MHz Location:  W/cm2:    []  Iontophoresis with dexamethasone         Location: [] Take home patch   [] In clinic    []  Ice     []  Heat posterior   []  Ice massage Position:   Location:    10 [x]  Vasopneumatic Device Pressure:       [] lo [x] med [] hi   Temperature: [x] lo [] med [] hi   [x] Skin assessment post-treatment:  [x]intact []redness- no adverse reaction       []redness - adverse reaction:       47 min Therapeutic Exercise:  [x] See flow sheet: added prone quad stretch with quad activation prior to stretch   Rationale: increase ROM, increase strength and improve coordination to improve the patients ability to ambulate, stairs, drive, dressing    16 min Manual Therapy: prone posterior chain rolling, prone extension OP, prone PNF CR for ext, supine knee flexion PROM with OP; hip flexor PNF stretching CR   Rationale: decrease pain, increase ROM and increase tissue extensibility to improve movement for gait, ADLs, stairs     NT min  Gait Trainin feet x 4 with SPC device on level surfaces  with SBA and VCs for knee flexion during initial swing, TKE for proper heel strike and min knee flexion during midstance   Rationale: increase proprioception, improve balance strategies, increase strength to improve the patient's ability to ambulate with reduced fall risk          X min Patient Education: [x] Review HEP - instructed pt on PNF stretching to reduce intrinsic mm guarding; added quad stretching     Other Objective/Functional Measures:    Full knee extension in prone. Educated patient on self-PNF stretching. Hip flexor stiffness on the (R) LE.    Knee EXT strength: 3-/5 due to lack of full TKE with LAQ (4/5 within available ROM)    Pain Level (0-10 scale) post treatment: 3    ASSESSMENT/Changes in Function:   Patient progressing well with PNF stretching to address flexion contracture. Notes MD is pleased regarding progress with HEP and PT and would like PT to continue to achieve maximal progress to return pt to ice skating. Patient will continue to benefit from skilled PT services to modify and progress therapeutic interventions, address functional mobility deficits, address ROM deficits, address strength deficits, analyze and address soft tissue restrictions, analyze and cue movement patterns, analyze and modify body mechanics/ergonomics, assess and modify postural abnormalities, address imbalance/dizziness and instruct in home and community integration to attain remaining goals.      []  See Plan of Care  [x]  See progress note/recertification  []  See Discharge Summary         Progress towards goals / Updated goals:  1) Patient  independent with HEP. 2) Patient will improve R knee AROM to 0-120 degrees to facilitate gait, transfers,driving. 3) Increase FOTO to 62 indicating improved function and quality of life. 4) Patient will increase R knee strength in quad to 5 so patient has improved ability to descend steps reciprocal, and ambulate with TKE and normal gait.    5) Patient able to stand for >1hr without AD to progress towards return to work.     PLAN  [x]  Upgrade activities as tolerated     [x]  Continue plan of care  []  Update interventions per flow sheet       []  Discharge due to:_  []  Other:_    Conrad Garza, PTA  3/27/2019

## 2019-03-28 ENCOUNTER — APPOINTMENT (OUTPATIENT)
Dept: PHYSICAL THERAPY | Age: 59
End: 2019-03-28
Payer: COMMERCIAL

## 2019-03-29 ENCOUNTER — HOSPITAL ENCOUNTER (OUTPATIENT)
Dept: PHYSICAL THERAPY | Age: 59
Discharge: HOME | End: 2019-03-29
Payer: COMMERCIAL

## 2019-03-29 PROCEDURE — 97140 MANUAL THERAPY 1/> REGIONS: CPT

## 2019-03-29 PROCEDURE — 97016 VASOPNEUMATIC DEVICE THERAPY: CPT

## 2019-03-29 PROCEDURE — 97110 THERAPEUTIC EXERCISES: CPT

## 2019-03-29 NOTE — PROGRESS NOTES
PT DAILY TREATMENT NOTE     Patient Name: Ranjana Fitzpatrick  Date:3/29/2019  : 1960  [x]  Patient  Verified  Payor: Abbi Leblanc / Plan: [employer]  CAPITATED PT / Product Type: Commerical /    In time: 8:31 am       Out time: 9:34 am  Total Treatment Time (min): 63  Visit #: 2 of     Treatment Area: Knee pain, right [M25.561]  Acute postoperative pain [G89.18]    SUBJECTIVE  Pain Level (0-10 scale): 3  Any medication changes, allergies to medications, adverse drug reactions, diagnosis change, or new procedure performed?: [x] No    [] Yes (see summary sheet for update)  Subjective functional status/changes:   [] No changes reported  \"I am still working on my knee. \"    OBJECTIVE  Modality rationale: decrease edema, decrease inflammation, decrease pain and increase tissue extensibility to improve the patients ability to ambulate, stairs , squat   Min Type Additional Details    [] Estim: []Att   []Unatt        []TENS instruct                  []IFC  []Premod   []NMES                     []Other:  []w/US   []w/ice   []w/heat  Position:  Location:    []  Traction: [] Cervical       []Lumbar                       [] Prone          []Supine                       []Intermittent   []Continuous Lbs:  [] before manual  [] after manual    []  Ultrasound: []Continuous   [] Pulsed                           []1MHz   []3MHz Location:  W/cm2:    []  Iontophoresis with dexamethasone         Location: [] Take home patch   [] In clinic    []  Ice     []  Heat posterior   []  Ice massage Position:   Location:    10 [x]  Vasopneumatic Device Pressure:       [] lo [x] med [] hi   Temperature: [x] lo [] med [] hi   [x] Skin assessment post-treatment:  [x]intact []redness- no adverse reaction       []redness - adverse reaction:       38 min Therapeutic Exercise:  [x] See flow sheet:   Rationale: increase ROM, increase strength and improve coordination to improve the patients ability to ambulate, stairs, drive, dressing    15 min Manual Therapy: prone posterior chain rolling, prone extension OP, prone PNF CR for ext, supine knee flexion PROM with OP; hip flexor PNF stretching CR   Rationale: decrease pain, increase ROM and increase tissue extensibility to improve movement for gait, ADLs, stairs         X min Patient Education: [x] Review HEP     Other Objective/Functional Measures:    Full knee EXT in prone limited by hip flexor tightness. Pain Level (0-10 scale) post treatment: 2    ASSESSMENT/Changes in Function:   Good response to PNF stretching on increasing mobility into extension. Patient will continue to benefit from skilled PT services to modify and progress therapeutic interventions, address functional mobility deficits, address ROM deficits, address strength deficits, analyze and address soft tissue restrictions, analyze and cue movement patterns, analyze and modify body mechanics/ergonomics, assess and modify postural abnormalities, address imbalance/dizziness and instruct in home and community integration to attain remaining goals. []  See Plan of Care  [x]  See progress note/recertification  []  See Discharge Summary         Progress towards goals / Updated goals:  1) Patient  independent with HEP. 2) Patient will improve R knee AROM to 0-120 degrees to facilitate gait, transfers, driving. 3) Increase FOTO to 62 indicating improved function and quality of life. 4) Patient will increase R knee strength in quad to 5 so patient has improved ability to descend steps reciprocal, and ambulate with TKE and normal gait.    5) Patient able to stand for >1hr without AD to progress towards return to work.     PLAN  [x]  Upgrade activities as tolerated     [x]  Continue plan of care  []  Update interventions per flow sheet       []  Discharge due to:_  []  Other:_    Nohemi Rios, PTA  3/29/2019

## 2019-04-01 ENCOUNTER — HOSPITAL ENCOUNTER (OUTPATIENT)
Dept: PHYSICAL THERAPY | Age: 59
Discharge: HOME | End: 2019-04-01
Payer: COMMERCIAL

## 2019-04-01 ENCOUNTER — APPOINTMENT (OUTPATIENT)
Dept: PHYSICAL THERAPY | Age: 59
End: 2019-04-01
Payer: COMMERCIAL

## 2019-04-01 PROCEDURE — 97110 THERAPEUTIC EXERCISES: CPT

## 2019-04-01 PROCEDURE — 97140 MANUAL THERAPY 1/> REGIONS: CPT

## 2019-04-01 PROCEDURE — 97016 VASOPNEUMATIC DEVICE THERAPY: CPT

## 2019-04-01 NOTE — PROGRESS NOTES
PT DAILY TREATMENT NOTE     Patient Name: Elisa Woods  Date:2019  : 1960  [x]  Patient  Verified  Payor: Brianna Screws / Plan: [employer]  CAPITATED PT / Product Type: Commerical /    In time: 3:28 pm       Out time: 4:30 pm  Total Treatment Time (min): 62  Visit #: 3 of     Treatment Area: Knee pain, right [M25.561]  Acute postoperative pain [G89.18]    SUBJECTIVE  Pain Level (0-10 scale): 3  Any medication changes, allergies to medications, adverse drug reactions, diagnosis change, or new procedure performed?: [x] No    [] Yes (see summary sheet for update)  Subjective functional status/changes:   [] No changes reported  Doing the exercises at home. I think I am getting straighter.     OBJECTIVE  Modality rationale: decrease edema, decrease inflammation, decrease pain and increase tissue extensibility to improve the patients ability to ambulate, stairs , squat   Min Type Additional Details    [] Estim: []Att   []Unatt        []TENS instruct                  []IFC  []Premod   []NMES                     []Other:  []w/US   []w/ice   []w/heat  Position:  Location:    []  Traction: [] Cervical       []Lumbar                       [] Prone          []Supine                       []Intermittent   []Continuous Lbs:  [] before manual  [] after manual    []  Ultrasound: []Continuous   [] Pulsed                           []1MHz   []3MHz Location:  W/cm2:    []  Iontophoresis with dexamethasone         Location: [] Take home patch   [] In clinic    []  Ice     []  Heat posterior   []  Ice massage Position:   Location:    10 [x]  Vasopneumatic Device Pressure:       [] lo [x] med [] hi   Temperature: [x] lo [] med [] hi   [x] Skin assessment post-treatment:  [x]intact []redness- no adverse reaction       []redness - adverse reaction:       35 min Therapeutic Exercise:  [x] See flow sheet: added SLS, lateral tap downs off 4\" step, DL leg press   Rationale: increase ROM, increase strength and improve coordination to improve the patients ability to ambulate, stairs, drive, dressing    17 min Manual Therapy: (R) knee PROM with PNF stretching of posterior chain, DTM post chain   Rationale: decrease pain, increase ROM and increase tissue extensibility to improve movement for gait, ADLs, stairs         X min Patient Education: [x] Review HEP     Other Objective/Functional Measures:    Knee AAROM: 1-116 deg    Pain Level (0-10 scale) post treatment: 2    ASSESSMENT/Changes in Function:   Slowly improving mobility. Patient cont's to req cueing to maintain symmetrical pelvis and avoid lateral lean with gait. Good response to ecc quad loading on leg press and pt notes ability to maintain symmetrical WB'ing. Patient will continue to benefit from skilled PT services to modify and progress therapeutic interventions, address functional mobility deficits, address ROM deficits, address strength deficits, analyze and address soft tissue restrictions, analyze and cue movement patterns, analyze and modify body mechanics/ergonomics, assess and modify postural abnormalities, address imbalance/dizziness and instruct in home and community integration to attain remaining goals. []  See Plan of Care  [x]  See progress note/recertification  []  See Discharge Summary         Progress towards goals / Updated goals:  1) Patient  independent with HEP. 2) Patient will improve R knee AROM to 0-120 degrees to facilitate gait, transfers, driving. -Goal progressing; knee AAROM 1-116 deg (4/1/19)  3) Increase FOTO to 62 indicating improved function and quality of life. 4) Patient will increase R knee strength in quad to 5 so patient has improved ability to descend steps reciprocal, and ambulate with TKE and normal gait.    5) Patient able to stand for >1hr without AD to progress towards return to work.     PLAN  [x]  Upgrade activities as tolerated     [x]  Continue plan of care  []  Update interventions per flow sheet       [] Discharge due to:_  [x]  Other: add fwd step down off 2\" step to improve knee flexion with gait    Patsy Mohamud, PTA  4/1/2019

## 2019-04-03 ENCOUNTER — HOSPITAL ENCOUNTER (OUTPATIENT)
Dept: PHYSICAL THERAPY | Age: 59
Discharge: HOME | End: 2019-04-03
Payer: COMMERCIAL

## 2019-04-03 PROCEDURE — 97140 MANUAL THERAPY 1/> REGIONS: CPT

## 2019-04-03 PROCEDURE — 97016 VASOPNEUMATIC DEVICE THERAPY: CPT

## 2019-04-03 PROCEDURE — 97110 THERAPEUTIC EXERCISES: CPT

## 2019-04-03 NOTE — PROGRESS NOTES
PT DAILY TREATMENT NOTE     Patient Name: Keo Miranda  Date:4/3/2019  : 1960  [x]  Patient  Verified  Payor: Rio Loja / Plan: [employer]  CAPITATED PT / Product Type: Commerical /    In time: 3:00 pm          Out time: 4:06 pm  Total Treatment Time (min): 66  Visit #: 4 of     Treatment Area: Knee pain, right [M25.561]  Acute postoperative pain [G89.18]    SUBJECTIVE  Pain Level (0-10 scale): 3  Any medication changes, allergies to medications, adverse drug reactions, diagnosis change, or new procedure performed?: [x] No    [] Yes (see summary sheet for update)  Subjective functional status/changes:   [] No changes reported  \"Feeling good. I have been practicing going down the stairs normally. \"    OBJECTIVE  Modality rationale: decrease edema, decrease inflammation, decrease pain and increase tissue extensibility to improve the patients ability to ambulate, stairs , squat   Min Type Additional Details    [] Estim: []Att   []Unatt        []TENS instruct                  []IFC  []Premod   []NMES                     []Other:  []w/US   []w/ice   []w/heat  Position:  Location:    []  Traction: [] Cervical       []Lumbar                       [] Prone          []Supine                       []Intermittent   []Continuous Lbs:  [] before manual  [] after manual    []  Ultrasound: []Continuous   [] Pulsed                           []1MHz   []3MHz Location:  W/cm2:    []  Iontophoresis with dexamethasone         Location: [] Take home patch   [] In clinic    []  Ice     []  Heat posterior   []  Ice massage Position:   Location:    10 [x]  Vasopneumatic Device Pressure:       [] lo [x] med [] hi   Temperature: [x] lo [] med [] hi   [x] Skin assessment post-treatment:  [x]intact []redness- no adverse reaction       []redness - adverse reaction:       42 min Therapeutic Exercise:  [x] See flow sheet: added step downs off 4\" step   Rationale: increase ROM, increase strength and improve coordination to improve the patients ability to ambulate, stairs, drive, dressing    14 min Manual Therapy: tibial mobs grade IV; (R) knee PROM with PNF stretching of posterior chain, DTM post chain, manual OP into extension   Rationale: decrease pain, increase ROM and increase tissue extensibility to improve movement for gait, ADLs, stairs         X min Patient Education: [x] Review HEP     Other Objective/Functional Measures:       Pain Level (0-10 scale) post treatment: 2    ASSESSMENT/Changes in Function:   Slowly progressing mobility into flexion with soft-tissue approx into flexion with tibial mobilizations. Patient req min VCs for pelvic positioning to avoid compensations with step ups / downs. Patient will continue to benefit from skilled PT services to modify and progress therapeutic interventions, address functional mobility deficits, address ROM deficits, address strength deficits, analyze and address soft tissue restrictions, analyze and cue movement patterns, analyze and modify body mechanics/ergonomics, assess and modify postural abnormalities, address imbalance/dizziness and instruct in home and community integration to attain remaining goals. []  See Plan of Care  [x]  See progress note/recertification  []  See Discharge Summary         Progress towards goals / Updated goals:  1) Patient  independent with HEP. 2) Patient will improve R knee AROM to 0-120 degrees to facilitate gait, transfers, driving. -Goal progressing; knee AAROM 1-116 deg (4/1/19)  3) Increase FOTO to 62 indicating improved function and quality of life. 4) Patient will increase R knee strength in quad to 5 so patient has improved ability to descend steps reciprocal, and ambulate with TKE and normal gait.    5) Patient able to stand for >1hr without AD to progress towards return to work.     PLAN  [x]  Upgrade activities as tolerated     [x]  Continue plan of care  []  Update interventions per flow sheet       []  Discharge due to:_  [x] Other: progress and condense HEP LEO Parks, PTA  4/3/2019

## 2019-04-05 ENCOUNTER — HOSPITAL ENCOUNTER (OUTPATIENT)
Dept: PHYSICAL THERAPY | Age: 59
Discharge: HOME | End: 2019-04-05
Payer: COMMERCIAL

## 2019-04-05 PROCEDURE — 97014 ELECTRIC STIMULATION THERAPY: CPT

## 2019-04-05 PROCEDURE — 97110 THERAPEUTIC EXERCISES: CPT

## 2019-04-05 PROCEDURE — 97140 MANUAL THERAPY 1/> REGIONS: CPT

## 2019-04-05 NOTE — PROGRESS NOTES
PT DAILY TREATMENT NOTE     Patient Name: Ifeanyi Orlando  Date:2019  : 1960  [x]  Patient  Verified  Payor: Kalenaleyda Kylah / Plan: [address] [place] [address] Rd PT / Product Type: Commerical /    In time: 8:59 am          Out time: 10:23 am  Total Treatment Time (min): 84  Visit #: 5 of     Treatment Area: Knee pain, right [M25.561]  Acute postoperative pain [G89.18]    SUBJECTIVE  Pain Level (0-10 scale): 2  Any medication changes, allergies to medications, adverse drug reactions, diagnosis change, or new procedure performed?: [x] No    [] Yes (see summary sheet for update)  Subjective functional status/changes:   [] No changes reported  \"Less burning pain today. I notice I have trouble walking down the stairs. \"    OBJECTIVE  Modality rationale: decrease inflammation, decrease pain and increase muscle contraction/control to improve the patients ability to ambulate, stairs , squat   Min Type Additional Details   10 [x] Estim: []Att   [x]Unatt                  []IFC  []Premod   [x]NMES                      [x]w/ice (anterior)    [x]w/heat (posterior)  Position: semi-reclined  Location: (R) quad    []  Traction: [] Cervical       []Lumbar                       [] Prone          []Supine                       []Intermittent   []Continuous Lbs:  [] before manual  [] after manual    []  Ultrasound: []Continuous   [] Pulsed                           []1MHz   []3MHz Location:  W/cm2:    []  Iontophoresis with dexamethasone         Location: [] Take home patch   [] In clinic    []  Ice     []  Heat posterior   []  Ice massage Position:   Location:     []  Vasopneumatic Device Pressure:       [] lo [] med [] hi   Temperature: [x] lo [] med [] hi   [x] Skin assessment post-treatment:  [x]intact []redness- no adverse reaction       []redness - adverse reaction:       60 min Therapeutic Exercise:  [x] See flow sheet: added glut preps (R) for hip/pelvic dissociation, added clams I and II, prone HS curls, toe yoga Rationale: increase ROM, increase strength and improve coordination to improve the patients ability to ambulate, stairs, drive, dressing    14 min Manual Therapy: tibial mobs grade IV; (R) knee PROM with PNF stretching of posterior chain, (R) mid-foot mobs to improve mobility, S/L stick-rolling STM to (R) ITB   Rationale: decrease pain, increase ROM and increase tissue extensibility to improve movement for gait, ADLs, stairs         X min Patient Education: [x] Review HEP - added toe yoga     Other Objective/Functional Measures:   Squat: valgus collapse noted  Stairs: reciprocal with HR (A), req cueing for knee flexion to descend due to long hx of step-to-step patterning    Switched VASO to NMES for quad retraining to improve stair negotiation. Pain Level (0-10 scale) post treatment: 0    ASSESSMENT/Changes in Function:   Patient progressing well with inc mobility following tibial mobs. Cont's with posterior chain tightness affecting end-range knee extension. Able to correct for (R) trunk lean during gait with cueing. Mid-foot hypomobility noted possibly due to compensations over 20 yr hx with limited cuboid and navicular spring; addressed with mobs and toe yoga. Patient will continue to benefit from skilled PT services to modify and progress therapeutic interventions, address functional mobility deficits, address ROM deficits, address strength deficits, analyze and address soft tissue restrictions, analyze and cue movement patterns, analyze and modify body mechanics/ergonomics, assess and modify postural abnormalities, address imbalance/dizziness and instruct in home and community integration to attain remaining goals. []  See Plan of Care  [x]  See progress note/recertification  []  See Discharge Summary         Progress towards goals / Updated goals:  1) Patient  independent with HEP.   2) Patient will improve R knee AROM to 0-120 degrees to facilitate gait, transfers, driving. -Goal progressing; knee AAROM 1-116 deg (4/1/19)  3) Increase FOTO to 62 indicating improved function and quality of life. 4) Patient will increase R knee strength in quad to 5 so patient has improved ability to descend steps reciprocal, and ambulate with TKE and normal gait.    5) Patient able to stand for >1hr without AD to progress towards return to work.     PLAN  [x]  Upgrade activities as tolerated     [x]  Continue plan of care  []  Update interventions per flow sheet       []  Discharge due to:_  [x]  Other: cont mobs/stretching as tolerated    [facility], PTA  4/5/2019

## 2019-04-08 ENCOUNTER — HOSPITAL ENCOUNTER (OUTPATIENT)
Dept: PHYSICAL THERAPY | Age: 59
Discharge: HOME | End: 2019-04-08
Payer: COMMERCIAL

## 2019-04-08 PROCEDURE — 97140 MANUAL THERAPY 1/> REGIONS: CPT

## 2019-04-08 PROCEDURE — 97016 VASOPNEUMATIC DEVICE THERAPY: CPT

## 2019-04-08 PROCEDURE — 97110 THERAPEUTIC EXERCISES: CPT

## 2019-04-08 NOTE — PROGRESS NOTES
PT DAILY TREATMENT NOTE     Patient Name: Lowell Basurto  Date:2019  : 1960  [x]  Patient  Verified  Payor: Elizabeth Comment / Plan: [address] PT / Product Type: Commerical /    In time: 11:28 am          Out time:  12:32 pm  Total Treatment Time (min): 64  Visit #: 6 of     Treatment Area: Knee pain, right [M25.561]  Acute postoperative pain [G89.18]    SUBJECTIVE  Pain Level (0-10 scale): 2  Any medication changes, allergies to medications, adverse drug reactions, diagnosis change, or new procedure performed?: [x] No    [] Yes (see summary sheet for update)  Subjective functional status/changes:   [] No changes reported  \"It's been feeling. The toe yoga is the hardest. 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 \"    OBJECTIVE  Modality rationale: decrease inflammation, decrease pain and increase muscle contraction/control to improve the patients ability to ambulate, stairs , squat   Min Type Additional Details   NT [x] Estim: []Att   [x]Unatt                  []IFC  []Premod   [x]NMES                      [x]w/ice (anterior)    [x]w/heat (posterior)  Position: semi-reclined  Location: (R) quad    []  Traction: [] Cervical       []Lumbar                       [] Prone          []Supine                       []Intermittent   []Continuous Lbs:  [] before manual  [] after manual    []  Ultrasound: []Continuous   [] Pulsed                           []1MHz   []3MHz Location:  W/cm2:    []  Iontophoresis with dexamethasone         Location: [] Take home patch   [] In clinic    []  Ice     []  Heat posterior   []  Ice massage Position:   Location:    10 [x]  Vasopneumatic Device Pressure:       [] lo [x] med [] hi   Temperature: [x] lo [] med [] hi   [x] Skin assessment post-treatment:  [x]intact []redness- no adverse reaction       []redness - adverse reaction:       38 min Therapeutic Exercise:  [x] See flow sheet: added wall squat   Rationale: increase ROM, increase strength and improve coordination to improve the patients ability to ambulate, stairs, drive, dressing    16 min Manual Therapy: tibial mobs grade IV; (R) knee PROM with PNF stretching of posterior chain, (R) mid-foot mobs to improve mobility, S/L stick-rolling STM to (R) ITB   Rationale: decrease pain, increase ROM and increase tissue extensibility to improve movement for gait, ADLs, stairs         X min Patient Education: [x] Review HEP - add wall squat     Other Objective/Functional Measures:   AROM flexion: 118 deg    Pain Level (0-10 scale) post treatment: 0-1    ASSESSMENT/Changes in Function:   Good tolerance with strength progression; pt fatigued with wall squat, but without pain. Improved mid-foot mobility. Patient will continue to benefit from skilled PT services to modify and progress therapeutic interventions, address functional mobility deficits, address ROM deficits, address strength deficits, analyze and address soft tissue restrictions, analyze and cue movement patterns, analyze and modify body mechanics/ergonomics, assess and modify postural abnormalities, address imbalance/dizziness and instruct in home and community integration to attain remaining goals. []  See Plan of Care  [x]  See progress note/recertification  []  See Discharge Summary         Progress towards goals / Updated goals:  1) Patient  independent with HEP. 2) Patient will improve R knee AROM to 0-120 degrees to facilitate gait, transfers, driving. -Goal progressing; knee AROM 1-118 deg (4/8/19)  3) Increase FOTO to 62 indicating improved function and quality of life. 4) Patient will increase R knee strength in quad to 5 so patient has improved ability to descend steps reciprocal, and ambulate with TKE and normal gait.    5) Patient able to stand for >1hr without AD to progress towards return to work.     PLAN  [x]  Upgrade activities as tolerated     [x]  Continue plan of care  []  Update interventions per flow sheet       []  Discharge due to:_  [x]  Other: cont to work/stretch/roll GERRI Stapleton, PTA  4/8/2019

## 2019-04-10 ENCOUNTER — HOSPITAL ENCOUNTER (OUTPATIENT)
Dept: PHYSICAL THERAPY | Age: 59
Discharge: HOME | End: 2019-04-10
Payer: COMMERCIAL

## 2019-04-10 PROCEDURE — 97016 VASOPNEUMATIC DEVICE THERAPY: CPT

## 2019-04-10 PROCEDURE — 97140 MANUAL THERAPY 1/> REGIONS: CPT

## 2019-04-10 PROCEDURE — 97110 THERAPEUTIC EXERCISES: CPT

## 2019-04-10 NOTE — PROGRESS NOTES
PT DAILY TREATMENT NOTE     Patient Name: Ranjana Fitzpatrick  Date:4/10/2019  : 1960  [x]  Patient  Verified  Payor: Abbi Leblanc / Plan: [facility] / Product Type: Commerical /    In time: 11:32 am         Out time:  12:41 am  Total Treatment Time (min): 69  Visit #: 7 of     Treatment Area: Knee pain, right [M25.561]  Acute postoperative pain [G89.18]    SUBJECTIVE  Pain Level (0-10 scale): 3  Any medication changes, allergies to medications, adverse drug reactions, diagnosis change, or new procedure performed?: [x] No    [] Yes (see summary sheet for update)  Subjective functional status/changes:   [] No changes reported  \"I went to the gym yesterday and did the leg curls. \"    OBJECTIVE  Modality rationale: decrease inflammation and decrease pain to improve the patients ability to ambulate, stairs , squat   Min Type Additional Details   10 [x]  Vasopneumatic Device Pressure:       [] lo [x] med [] hi   Temperature: [x] lo [] med [] hi   [x] Skin assessment post-treatment:  [x]intact []redness- no adverse reaction       []redness - adverse reaction:       35 min Therapeutic Exercise:  [x] See flow sheet: progressed wall squats; initiated TM walking to normalize gait   Rationale: increase ROM, increase strength and improve coordination to improve the patients ability to ambulate, stairs, drive, dressing    24 min Manual Therapy: tibial mobs grade IV; (R) knee PROM with PNF stretching of posterior chain, (R) mid-foot mobs to improve mobility   Rationale: decrease pain, increase ROM and increase tissue extensibility to improve movement for gait, ADLs, stairs         X min Patient Education: [x] Review HEP; OK to initiate TM walking at gym     Other Objective/Functional Measures:   Patient working with : upper body exercise/core, leg curl machine, hip ABD machine, only    Pain Level (0-10 scale) post treatment: 3    ASSESSMENT/Changes in Function:   Patient with improving mid-foot mobility following mobilization of the cuboid and cuneiforms, difficulty mobilizing the navicular today. Improved weight shift and LE loading with leg press/gait sec inc strength and confidence. Patient will continue to benefit from skilled PT services to modify and progress therapeutic interventions, address functional mobility deficits, address ROM deficits, address strength deficits, analyze and address soft tissue restrictions, analyze and cue movement patterns, analyze and modify body mechanics/ergonomics, assess and modify postural abnormalities, address imbalance/dizziness and instruct in home and community integration to attain remaining goals. []  See Plan of Care  [x]  See progress note/recertification  []  See Discharge Summary         Progress towards goals / Updated goals:  1) Patient  independent with HEP. 2) Patient will improve R knee AROM to 0-120 degrees to facilitate gait, transfers, driving. -Goal progressing; knee AROM 1-118 deg (4/8/19)  3) Increase FOTO to 62 indicating improved function and quality of life. 4) Patient will increase R knee strength in quad to 5 so patient has improved ability to descend steps reciprocal, and ambulate with TKE and normal gait.    5) Patient able to stand for >1hr without AD to progress towards return to work.     PLAN  [x]  Upgrade activities as tolerated     [x]  Continue plan of care  []  Update interventions per flow sheet       []  Discharge due to:_  [x]  Other: cont to work/stretch/roll ITBLANCO Tarango, PTA  4/10/2019

## 2019-04-12 ENCOUNTER — HOSPITAL ENCOUNTER (OUTPATIENT)
Dept: PHYSICAL THERAPY | Age: 59
Discharge: HOME | End: 2019-04-12
Payer: COMMERCIAL

## 2019-04-12 PROCEDURE — 97016 VASOPNEUMATIC DEVICE THERAPY: CPT

## 2019-04-12 PROCEDURE — 97140 MANUAL THERAPY 1/> REGIONS: CPT

## 2019-04-12 PROCEDURE — 97110 THERAPEUTIC EXERCISES: CPT

## 2019-04-12 NOTE — PROGRESS NOTES
PT DAILY TREATMENT NOTE     Patient Name: Keo Miranda  Date:2019  : 1960  [x]  Patient  Verified  Payor: Rio Loja / [facility] Product Type: Com[facility] /    In time: 9:30 am         Out time:  10:37 am  Total Treatment Time (min): 67  Visit #: 8 of     Treatment Area: Knee pain, right [M25.561]  Acute postoperative pain [G89.18]    SUBJECTIVE  Pain Level (0-10 scale): 3  Any medication changes, allergies to medications, adverse drug reactions, diagnosis change, or new procedure performed?: [x] No    [] Yes (see summary sheet for update)  Subjective functional status/changes:   [] No changes reported  \"I am okay. \"    OBJECTIVE  Modality rationale: decrease inflammation and decrease pain to improve the patients ability to ambulate, stairs , squat   Min Type Additional Details   10 [x]  Vasopneumatic Device Pressure:       [] lo [x] med [] hi   Temperature: [x] lo [] med [] hi   [x] Skin assessment post-treatment:  [x]intact []redness- no adverse reaction       []redness - adverse reaction:       43 min Therapeutic Exercise:  [x] See flow sheet:   Rationale: increase ROM, increase strength and improve coordination to improve the patients ability to ambulate, stairs, drive, dressing    14 min Manual Therapy: tibial mobs grade IV; (R) knee PROM with PNF stretching of posterior chain, (R) mid-foot mobs to improve mobility   Rationale: decrease pain, increase ROM and increase tissue extensibility to improve movement for gait, ADLs, stairs         X min Patient Education: [x] Review HEP     Other Objective/Functional Measures:     Pain Level (0-10 scale) post treatment: 1-2    ASSESSMENT/Changes in Function:   Good tolerance to treatment.     Patient will continue to benefit from skilled PT services to modify and progress therapeutic interventions, address functional mobility deficits, address ROM deficits, address strength deficits, analyze and address soft tissue restrictions, analyze and cue movement patterns, analyze and modify body mechanics/ergonomics, assess and modify postural abnormalities, address imbalance/dizziness and instruct in home and community integration to attain remaining goals. []  See Plan of Care  [x]  See progress note/recertification  []  See Discharge Summary         Progress towards goals / Updated goals:  1) Patient  independent with HEP. 2) Patient will improve R knee AROM to 0-120 degrees to facilitate gait, transfers, driving. -Goal progressing; knee AROM 1-118 deg (4/8/19)  3) Increase FOTO to 62 indicating improved function and quality of life. 4) Patient will increase R knee strength in quad to 5 so patient has improved ability to descend steps reciprocal, and ambulate with TKE and normal gait.    5) Patient able to stand for >1hr without AD to progress towards return to work.     PLAN  [x]  Upgrade activities as tolerated     [x]  Continue plan of care  []  Update interventions per flow sheet       []  Discharge due to:_  []  Other:_    Karl Asher, PTA  4/12/2019

## 2019-04-15 ENCOUNTER — HOSPITAL ENCOUNTER (OUTPATIENT)
Dept: PHYSICAL THERAPY | Age: 59
Discharge: HOME | End: 2019-04-15
Payer: COMMERCIAL

## 2019-04-15 PROCEDURE — 97016 VASOPNEUMATIC DEVICE THERAPY: CPT

## 2019-04-15 PROCEDURE — 97110 THERAPEUTIC EXERCISES: CPT

## 2019-04-15 PROCEDURE — 97140 MANUAL THERAPY 1/> REGIONS: CPT

## 2019-04-15 NOTE — PROGRESS NOTES
PT DAILY TREATMENT NOTE     Patient Name: Yarely Spencer  Date:4/15/2019  : 1960  [x]  Patient  Verified  Payor: Azam Littlejohn / Plan: [address] PT / Product Type: Commerical /    In time: 1:55 pm       Out time:  3:04 pm  Total Treatment Time (min): 69  Visit #: 9 of     Treatment Area: Knee pain, right [M25.561]  Acute postoperative pain [G89.18]    SUBJECTIVE  Pain Level (0-10 scale): 2  Any medication changes, allergies to medications, adverse drug reactions, diagnosis change, or new procedure performed?: [x] No    [] Yes (see summary sheet for update)  Subjective functional status/changes:   [] No changes reported  \"I walked for 15 minutes on the treadmill before I got here. \"    OBJECTIVE  Modality rationale: decrease inflammation and decrease pain to improve the patients ability to ambulate, stairs , squat   Min Type Additional Details   10 [x]  Vasopneumatic Device Pressure:       [] lo [x] med [] hi   Temperature: [x] lo [] med [] hi   [x] Skin assessment post-treatment:  [x]intact []redness- no adverse reaction       []redness - adverse reaction:       47 min Therapeutic Exercise:  [x] See flow sheet: added bridges   Rationale: increase ROM, increase strength and improve coordination to improve the patients ability to ambulate, stairs, drive, dressing    12 min Manual Therapy: (R) LE retro-massage; tibial mobs grade IV; (R) knee PROM with PNF stretching of posterior chain   Rationale: decrease pain, increase ROM and increase tissue extensibility to improve movement for gait, ADLs, stairs         X min Patient Education: [x] Review HEP - hip flexor stretching and bridging with RTB     Other Objective/Functional Measures:   SLS foam: 21 seconds    Pain Level (0-10 scale) post treatment: 3    ASSESSMENT/Changes in Function:   Improving mobility into flexion, but slow progress into end-range extension sec mm tightness/hx of contracture.  Good SLS stability with foam.    Patient will continue to benefit from skilled PT services to modify and progress therapeutic interventions, address functional mobility deficits, address ROM deficits, address strength deficits, analyze and address soft tissue restrictions, analyze and cue movement patterns, analyze and modify body mechanics/ergonomics, assess and modify postural abnormalities, address imbalance/dizziness and instruct in home and community integration to attain remaining goals. []  See Plan of Care  [x]  See progress note/recertification  []  See Discharge Summary         Progress towards goals / Updated goals:  1) Patient  independent with HEP. 2) Patient will improve R knee AROM to 0-120 degrees to facilitate gait, transfers, driving. -Goal progressing; knee AROM 1-118 deg (4/8/19)  3) Increase FOTO to 62 indicating improved function and quality of life. 4) Patient will increase R knee strength in quad to 5 so patient has improved ability to descend steps reciprocal, and ambulate with TKE and normal gait.    5) Patient able to stand for >1hr without AD to progress towards return to work.     PLAN  [x]  Upgrade activities as tolerated     [x]  Continue plan of care  []  Update interventions per flow sheet       []  Discharge due to:_  []  Other:_    Karl Shaver, PTA  4/15/2019

## 2019-04-17 ENCOUNTER — HOSPITAL ENCOUNTER (OUTPATIENT)
Dept: PHYSICAL THERAPY | Age: 59
Discharge: HOME | End: 2019-04-17
Payer: COMMERCIAL

## 2019-04-17 PROCEDURE — 97016 VASOPNEUMATIC DEVICE THERAPY: CPT

## 2019-04-17 PROCEDURE — 97140 MANUAL THERAPY 1/> REGIONS: CPT

## 2019-04-17 PROCEDURE — 97110 THERAPEUTIC EXERCISES: CPT

## 2019-04-17 NOTE — PROGRESS NOTES
PT DAILY TREATMENT NOTE     Patient Name: Lukasz Blanca  Date:2019  : 1960  [x]  Patient  Verified  Payor: Rosas Cano / Plan: [facility] PT / Product Type: Commerical /    In time: 10:59 am       Out time:  12:16 pm  Total Treatment Time (min): 77  Visit #: 10 of     Treatment Area: Knee pain, right [M25.561]  Acute postoperative pain [G89.18]    SUBJECTIVE  Pain Level (0-10 scale): 1  Any medication changes, allergies to medications, adverse drug reactions, diagnosis change, or new procedure performed?: [x] No    [] Yes (see summary sheet for update)  Subjective functional status/changes:   [] No changes reported  \"I was a little more sore after last time, probably from walking on the treadmill for 15 minutes, then seeing pilar, then coming here, but I rested and feel good today. \"    OBJECTIVE  Modality rationale: decrease inflammation and decrease pain to improve the patients ability to ambulate, stairs , squat   Min Type Additional Details   10 [x]  Vasopneumatic Device Pressure:       [] lo [x] med [] hi   Temperature: [x] lo [] med [] hi   [x] Skin assessment post-treatment:  [x]intact []redness- no adverse reaction       []redness - adverse reaction:       56 min Therapeutic Exercise:  [x] See flow sheet: added ball squeeze for hip ADD to improve mm tone therex   Rationale: increase ROM, increase strength and improve coordination to improve the patients ability to ambulate, stairs, drive, dressing    11 min Manual Therapy: tibial mobs grade IV; (R) knee PROM with PNF stretching of posterior chain; stick-rolling STM to RF   Rationale: decrease pain, increase ROM and increase tissue extensibility to improve movement for gait, ADLs, stairs         X min Patient Education: [x] Review HEP     Other Objective/Functional Measures:   Knee EXT AROM: 0 deg after hip flexor stretching  Knee FLEX AROM: 122 deg    Pain Level (0-10 scale) post treatment: 1    ASSESSMENT/Changes in Function:   Edema improved from last session; pt may require retro massage NV as she is returning to her  tomorrow; discussed progressive LE activity pt is able to perform with , including avoiding squatting, leg extension machine, kneeling, lunges, exercise that promote compensations. Patient will continue to benefit from skilled PT services to modify and progress therapeutic interventions, address functional mobility deficits, address ROM deficits, address strength deficits, analyze and address soft tissue restrictions, analyze and cue movement patterns, analyze and modify body mechanics/ergonomics, assess and modify postural abnormalities, address imbalance/dizziness and instruct in home and community integration to attain remaining goals. []  See Plan of Care  [x]  See progress note/recertification  []  See Discharge Summary         Progress towards goals / Updated goals:  1) Patient  independent with HEP. 2) Patient will improve R knee AROM to 0-120 degrees to facilitate gait, transfers, driving. -Goal progressing; knee AROM 0-124 deg (4/17/19)  3) Increase FOTO to 62 indicating improved function and quality of life. 4) Patient will increase R knee strength in quad to 5 so patient has improved ability to descend steps reciprocal, and ambulate with TKE and normal gait.    5) Patient able to stand for >1hr without AD to progress towards return to work. -Goal met; pt notes ability to stand > 1 hour (4/17/19)    PLAN  [x]  Upgrade activities as tolerated     [x]  Continue plan of care  []  Update interventions per flow sheet       []  Discharge due to:_  [x]  Other: reassessment due NV; pt would like to cont to progress to goal of returning to ice skating (as allowed by MD)    Jakob Gonzales, PTA  4/17/2019

## 2019-04-19 ENCOUNTER — HOSPITAL ENCOUNTER (OUTPATIENT)
Dept: PHYSICAL THERAPY | Age: 59
Discharge: HOME | End: 2019-04-19
Payer: COMMERCIAL

## 2019-04-19 PROCEDURE — 97116 GAIT TRAINING THERAPY: CPT

## 2019-04-19 PROCEDURE — 97110 THERAPEUTIC EXERCISES: CPT

## 2019-04-19 PROCEDURE — 97140 MANUAL THERAPY 1/> REGIONS: CPT

## 2019-04-19 PROCEDURE — 97016 VASOPNEUMATIC DEVICE THERAPY: CPT

## 2019-04-19 NOTE — PROGRESS NOTES
PT DAILY TREATMENT NOTE     Patient Name: Lowell Basurto  Date:2019  : 1960  [x]  Patient  Verified  Payor: Elizabeth Comment / Plan: [employer]  CAPITATED PT / Product Type: Commerical /    In time: 10:01 am        Out time: 11:02 am  Total Treatment Time (min):  61  Visit #: 11 of     Treatment Area: Knee pain, right [M25.561]  Acute postoperative pain [G89.18]    SUBJECTIVE  Pain Level (0-10 scale): 1  Any medication changes, allergies to medications, adverse drug reactions, diagnosis change, or new procedure performed?: [x] No    [] Yes (see summary sheet for update)  Subjective functional status/changes:   [] No changes reported  \"I was able to walk on the treadmill for 20 minutes, but I had cankles afterwards. \"    OBJECTIVE  Modality rationale: decrease inflammation and decrease pain to improve the patients ability to ambulate, stairs , squat   Min Type Additional Details   10 [x]  Vasopneumatic Device Pressure:       [] lo [x] med [] hi   Temperature: [x] lo [] med [] hi   [x] Skin assessment post-treatment:  [x]intact []redness- no adverse reaction       []redness - adverse reaction:       33 min Therapeutic Exercise:  [x] See flow sheet:    Rationale: increase ROM, increase strength and improve coordination to improve the patients ability to ambulate, stairs, drive, dressing    10 min Manual Therapy: tibial mobs grade IV; (R) knee PROM with PNF stretching of posterior chain; stick-rolling STM to RF   Rationale: decrease pain, increase ROM and increase tissue extensibility to improve movement for gait, ADLs, stairs     8 min Gait Trainin feet x 5, (I), on level surfaces with S and VC's to for proper knee mechanics during midstance and to inc ankle PF for proper heel/toe off   Rationale: increase proprioception, improve balance strategies, increase strength to improve the patient's ability to ambulate with reduced fall risk        X min Patient Education: [x] Review HEP     Other Objective/Functional Measures:   Gait: minimal to no ankle PF for proper push off during terminal stance, (I), decreased knee flexion at midstance; no circumduction, decreased ankle DF for proper heel strike, but it is present    Pain Level (0-10 scale) post treatment: 1    ASSESSMENT/Changes in Function:   Good tolerance to treatment with patient demo'ing improved tibial rotation to inc knee EXT. Edema persists, likely preventing full knee mobility as pt feels \"fluid stiffness\" into end ranges (flex/ext), therefore, cont'd VASO. Patient demos (I) gait with inc push off after cueing. Patient will continue to benefit from skilled PT services to modify and progress therapeutic interventions, address functional mobility deficits, address ROM deficits, address strength deficits, analyze and address soft tissue restrictions, analyze and cue movement patterns, analyze and modify body mechanics/ergonomics, assess and modify postural abnormalities, address imbalance/dizziness and instruct in home and community integration to attain remaining goals. []  See Plan of Care  [x]  See progress note/recertification  []  See Discharge Summary         Progress towards goals / Updated goals:  1) Patient  independent with HEP. 2) Patient will improve R knee AROM to 0-120 degrees to facilitate gait, transfers, driving. -Goal progressing; knee AROM 0-124 deg (4/17/19)  3) Increase FOTO to 62 indicating improved function and quality of life. 4) Patient will increase R knee strength in quad to 5 so patient has improved ability to descend steps reciprocal, and ambulate with TKE and normal gait.    5) Patient able to stand for >1hr without AD to progress towards return to work. -Goal met; pt notes ability to stand > 1 hour (4/17/19)    PLAN  [x]  Upgrade activities as tolerated     [x]  Continue plan of care  []  Update interventions per flow sheet       []  Discharge due to:_  []  Other:_    Boris Chung, PTA  4/19/2019

## 2019-04-23 ENCOUNTER — HOSPITAL ENCOUNTER (OUTPATIENT)
Dept: PHYSICAL THERAPY | Age: 59
Discharge: HOME | End: 2019-04-23
Payer: COMMERCIAL

## 2019-04-23 PROCEDURE — 97110 THERAPEUTIC EXERCISES: CPT

## 2019-04-23 PROCEDURE — 97140 MANUAL THERAPY 1/> REGIONS: CPT

## 2019-04-23 PROCEDURE — 97016 VASOPNEUMATIC DEVICE THERAPY: CPT

## 2019-04-23 NOTE — PROGRESS NOTES
[address] [facility] 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  DOS . Treatment has included aggressive stretching and manual treatment and VASO ice machine for swelling. CURRENT STATUS  Patient has made excellent progress in PT, demonstrating increased knee mobility and strength since last assessment despite r/o long hx of knee flexion/extension contractures prior to surgery. Patient denies joint related pain, but continues with \"pins and needles / pinching\" discomfort along incision line. Patient is (I) with scar massage at home. Patient would like to return to ice skating as this was patient's PLOF, but has been educated on refrain until clearance from MD. Assessment as follows:  Subjective improvement of 95% in symptoms since IE reported. Improvements: gait quality, ADLs, \"I can pretty much do everything\", transfers  Deficits: stair negotiation (descent>ascent, with valgus collapse during descent), ice skating, normalized gait  FOTO score: 61/100 (at last assessment, 35/100)  (R) knee A/PROM: 3-120 deg  (R) knee strength: 5/5 within available ROM  (R) hip strength: flex 4+/5, abd/add 4/5, ext 5/5 (glut max 4+/5)  Core strength: 100% bridge  Balance, foam SLS: 18\"  Edema via girth measurements:                @ mid-patella 40.5 cm               @ 2 inches above patella 41 cm               @ 2 inches below patella 36.5 cm  Pain: (B) 0, (W) 3    Goal/Measure of Progress   1) Patient  independent with HEP.  -Goal met; pt notes strict HEP compliance  2) Patient will improve R knee AROM to 0-120 degrees to facilitate gait, transfers, driving. -Goal progressing; knee AROM 3-120 deg  3) Increase FOTO to 62 indicating improved function and quality of life. -Goal near met; 61/100 FOTO score  4) Patient will increase R knee strength in quad to 5 so patient has improved ability to descend steps reciprocal, and ambulate with TKE and normal gait. -Goal met; pt demos 5/5 knee EXT strength   5) Patient able to stand for >1hr without AD to progress towards return to work. -Goal met; pt notes ability to stand > 1 hour    New Goals to be achieved in __4-6__  treatments:  1. Patient will improve right knee EXT AROM to 0 deg to promote normalized gait pattern. 2.  Patient will demo 4+/5 hip ABD strength to improve pelvic stability for stair descent. 3.  Increase FOTO to 62 indicating improved function and quality of life. 4.  Patient will demo foam SLS to 30\" to indicate proper LE stability for progression towards return to ice skating. RECOMMENDATIONS  Recommend cont skilled PT for remaining 4 sessions, 2x weekly, then 1x2 if needed, to address deficits and achieve stated goals. If you have any questions/comments please contact us directly at [phone]. Thank you for allowing us to assist in the care of your patient. LPTA Signature: Rose Man  Date: 4/23/2019   PT Signature: Adam Noonna DPT  Time: 4:01 PM   NOTE TO PHYSICIAN:  PLEASE COMPLETE THE ORDERS BELOW AND FAX TO   [facility]: [phone].   If you are unable to process this request in 24 hours please contact our office: [phone].  ___ I have read the above report and request that my patient continue as recommended.   ___ I have read the above report and request that my patient continue therapy with the following changes/special instructions:_________________________________________________________   ___ I have read the above report and request that my patient be discharged from therapy.      Physician Signature:        Date:       Time:

## 2019-04-26 ENCOUNTER — HOSPITAL ENCOUNTER (OUTPATIENT)
Dept: PHYSICAL THERAPY | Age: 59
Discharge: HOME | End: 2019-04-26
Payer: COMMERCIAL

## 2019-04-26 PROCEDURE — 97110 THERAPEUTIC EXERCISES: CPT | Performed by: PHYSICAL THERAPIST

## 2019-04-26 PROCEDURE — 97140 MANUAL THERAPY 1/> REGIONS: CPT | Performed by: PHYSICAL THERAPIST

## 2019-04-26 PROCEDURE — 97016 VASOPNEUMATIC DEVICE THERAPY: CPT | Performed by: PHYSICAL THERAPIST

## 2019-04-26 NOTE — PROGRESS NOTES
PT DAILY TREATMENT NOTE 8-    Patient Name: Kim Olivares  Date:2019  : 1960  [x]  Patient  Verified  Payor: Marjorie Pacheco / [place] [employer] Type: Commer[facility] /    In time: 1157 am       Out time: 1252am  Total Treatment Time (min): 55min  Visit #: 1 of 4    Treatment Area: Knee pain, right [M25.561]  Acute postoperative pain [G89.18]    SUBJECTIVE  Pain Level (0-10 scale): 1  Any medication changes, allergies to medications, adverse drug reactions, diagnosis change, or new procedure performed?: [x] No    [] Yes (see summary sheet for update)  Subjective functional status/changes:   [] No changes reported  \"I go back to work on Wed, so I am nervous. It feels just like the skin now. \"    OBJECTIVE  Modality rationale: decrease inflammation and decrease pain to improve the patients ability to ambulate, stairs , squat   Min Type Additional Details   10 [x]  Vasopneumatic Device Pressure:       [] lo [x] med [] hi   Temperature: [x] lo [] med [] hi   [x] Skin assessment post-treatment:  [x]intact []redness- no adverse reaction       []redness - adverse reaction:     37 min Therapeutic Exercise:  [x] See flow sheet:    Rationale: increase ROM, increase strength and improve coordination to improve the patients ability to ambulate, stairs, drive, dressing    8 min Manual Therapy: tibial mobs grade IV; (R) knee PROM with PNF stretching of posterior chain   Rationale: decrease pain, increase ROM and increase tissue extensibility to improve movement for gait, ADLs, stairs         X min Patient Education: [x] Review HEP     Other Objective/Functional Measures:   Foam SLS: 30sec   Goal met  Increased SL on leg press to 60lbs. Pain Level (0-10 scale) post treatment: 0    ASSESSMENT/Changes in Function:   First FU since PN last visit. Pt continues to lack TKE, Performed mobes and instructed pt in prolonged stretches for HEP several times a day.  TrP noted in R ADD mm, which patient reports came on after 22 min on TM yesterday. Patient will continue to benefit from skilled PT services to modify and progress therapeutic interventions, address functional mobility deficits, address ROM deficits, address strength deficits, analyze and address soft tissue restrictions, analyze and cue movement patterns, analyze and modify body mechanics/ergonomics, assess and modify postural abnormalities, address imbalance/dizziness and instruct in home and community integration to attain remaining goals. []  See Plan of Care  [x]  See progress note/recertification (2/58/25)  []  See Discharge Summary         Progress towards goals / Updated goals:  New Goals to be achieved in __4-6__  treatments:  1. Patient will improve right knee EXT AROM to 0 deg to promote normalized gait pattern. 2.  Patient will demo 4+/5 hip ABD strength to improve pelvic stability for stair descent. 3.  Increase FOTO to 62 indicating improved function and quality of life. 4.  Patient will demo foam SLS to 30\" to indicate proper LE stability for progression towards return to ice skating. 30 sec  x3 reps  Goal met 4/26/19.       PLAN  [x]  Upgrade activities as tolerated     [x]  Continue plan of care  []  Update interventions per flow sheet       []  Discharge due to:_  []  Other:     Belen Peters, PT  4/26/2019

## 2019-04-29 ENCOUNTER — APPOINTMENT (OUTPATIENT)
Dept: PHYSICAL THERAPY | Age: 59
End: 2019-04-29
Payer: COMMERCIAL

## 2019-04-30 ENCOUNTER — HOSPITAL ENCOUNTER (OUTPATIENT)
Dept: PHYSICAL THERAPY | Age: 59
Discharge: HOME | End: 2019-04-30
Payer: COMMERCIAL

## 2019-04-30 PROCEDURE — 97140 MANUAL THERAPY 1/> REGIONS: CPT

## 2019-04-30 PROCEDURE — 97110 THERAPEUTIC EXERCISES: CPT

## 2019-04-30 NOTE — PROGRESS NOTES
PT DAILY TREATMENT NOTE 8-14    Patient Name: Alethea Barrett  Date:2019  : 1960  [x]  Patient  Verified  Payor: Bisi Márquez / Plan: [employer]  CAPITATED PT / Product Type: Commerical /    In time: 2:01 pm     Out time: 3:05 pm  Total Treatment Time (min): 64  Visit #: 2 of 4    Treatment Area: Knee pain, right [M25.561]  Acute postoperative pain [G89.18]    SUBJECTIVE  Pain Level (0-10 scale): 1  Any medication changes, allergies to medications, adverse drug reactions, diagnosis change, or new procedure performed?: [x] No    [] Yes (see summary sheet for update)  Subjective functional status/changes:   [] No changes reported  \"I have been walking > 1 mile on the treadmill. \"    OBJECTIVE  Modality rationale: decrease inflammation and decrease pain to improve the patients ability to ambulate, stairs , squat   Min Type Additional Details   10 [x] Ice Position: semi-reclined  Location: (R) knee   [x] Skin assessment post-treatment:  [x]intact []redness- no adverse reaction       []redness - adverse reaction:     42 min Therapeutic Exercise:  [x] See flow sheet: added deadlifts to progress glut strength for ice skating/one leg pivoting, BOSU taps/balance    Rationale: increase ROM, increase strength and improve coordination to improve the patients ability to ambulate, stairs, drive, dressing    12 min Manual Therapy: tibial mobs grade IV; (R) knee PROM with PNF stretching of posterior chain   Rationale: decrease pain, increase ROM and increase tissue extensibility to improve movement for gait, ADLs, stairs         X min Patient Education: [x] Review HEP - OK to initiate deadlifts with ; we will progress to RDLs NV     Other Objective/Functional Measures:   Hip ABD strength (glut med): 4/5    Pain Level (0-10 scale) post treatment: 0    ASSESSMENT/Changes in Function:   Slow progress with glut med strength.     Patient will continue to benefit from skilled PT services to modify and progress therapeutic interventions, address functional mobility deficits, address ROM deficits, address strength deficits, analyze and address soft tissue restrictions, analyze and cue movement patterns, analyze and modify body mechanics/ergonomics, assess and modify postural abnormalities, address imbalance/dizziness and instruct in home and community integration to attain remaining goals. []  See Plan of Care  [x]  See progress note/recertification (5/85/56)  []  See Discharge Summary         Progress towards goals / Updated goals:  New Goals to be achieved in __4-6__  treatments:  1. Patient will improve right knee EXT AROM to 0 deg to promote normalized gait pattern. 2.  Patient will demo 4+/5 hip ABD strength to improve pelvic stability for stair descent. -Goal progressing; pt demos 4/5 glut med strength (4/30/19)  3. Increase FOTO to 62 indicating improved function and quality of life. 4.  Patient will demo foam SLS to 30\" to indicate proper LE stability for progression towards return to ice skating. 30 sec  x3 reps  Goal met 4/26/19.       PLAN  [x]  Upgrade activities as tolerated     [x]  Continue plan of care  []  Update interventions per flow sheet       []  Discharge due to:_  [x]  Other: add RDLs LEO Tarango, PTA  4/30/2019

## 2019-05-03 ENCOUNTER — HOSPITAL ENCOUNTER (OUTPATIENT)
Dept: PHYSICAL THERAPY | Age: 59
Discharge: HOME | End: 2019-05-03
Payer: COMMERCIAL

## 2019-05-03 PROCEDURE — 97140 MANUAL THERAPY 1/> REGIONS: CPT

## 2019-05-03 PROCEDURE — 97110 THERAPEUTIC EXERCISES: CPT

## 2019-05-03 NOTE — PROGRESS NOTES
PT DAILY TREATMENT NOTE 8    Patient Name: Elvin Simmons  Date:5/3/2019  : 1960  [x]  Patient  Verified  Payor: Romeo Galaviz / [facility] Type: Com[facility] /    In time: 9:27 am    Out time: 10:36 am  Total Treatment Time (min): 59  Visit #: 3 of 4    Treatment Area: Knee pain, right [M25.561]  Acute postoperative pain [G89.18]    SUBJECTIVE  Pain Level (0-10 scale): 0 \"stiffness\"  Any medication changes, allergies to medications, adverse drug reactions, diagnosis change, or new procedure performed?: [x] No    [] Yes (see summary sheet for update)  Subjective functional status/changes:   [] No changes reported  \"I am back to work, no breaks. I mean we are just so busy. I had so much swelling afterwards, but everything was okay (*at work*). \" Patient notes she will be seeing her  later today at 12 pm.    OBJECTIVE  Modality rationale: decrease inflammation and decrease pain to improve the patients ability to ambulate, stairs , squat   Min Type Additional Details   10 [x] Ice Position: semi-reclined  Location: (R) knee   [x] Skin assessment post-treatment:  [x]intact []redness- no adverse reaction       []redness - adverse reaction:     39 min Therapeutic Exercise:  [x] See flow sheet: initiated RDLs, solorio hip lift, S/L hip ADD   Rationale: increase ROM, increase strength and improve coordination to improve the patients ability to ambulate, stairs, drive, dressing    10 min Manual Therapy: retro massage with LE elevated on mat; PNF CR hamstring and calf stretching; supine manual OP for extension   Rationale: decrease pain, increase ROM and increase tissue extensibility to improve movement for gait, ADLs, stairs         X min Patient Education: [x] Review HEP     Other Objective/Functional Measures:   1 degree knee EXT AROM after stretching  Notable edema today, pitting at the ankle.     Pain Level (0-10 scale) post treatment: 0    ASSESSMENT/Changes in Function:   Good SLS stability noted for RDLs; pt req min cueing to avoid trunk substitutions. Patient notes return to full work duties with inc overall edema following. Discussed edema management techniques. Patient will continue to benefit from skilled PT services to modify and progress therapeutic interventions, address functional mobility deficits, address ROM deficits, address strength deficits, analyze and address soft tissue restrictions, analyze and cue movement patterns, analyze and modify body mechanics/ergonomics, assess and modify postural abnormalities, address imbalance/dizziness and instruct in home and community integration to attain remaining goals. []  See Plan of Care  [x]  See progress note/recertification (3/27/48)  []  See Discharge Summary         Progress towards goals / Updated goals:  New Goals to be achieved in __4-6__  treatments:  1. Patient will improve right knee EXT AROM to 0 deg to promote normalized gait pattern. -Goal progressing; 1 deg knee EXT AROM after stretching and manual (5/3/19)  2. Patient will demo 4+/5 hip ABD strength to improve pelvic stability for stair descent. -Goal progressing; pt demos 4/5 glut med strength (4/30/19)  3. Increase FOTO to 62 indicating improved function and quality of life. 4.  Patient will demo foam SLS to 30\" to indicate proper LE stability for progression towards return to ice skating. 30 sec  x3 reps  Goal met 4/26/19.       PLAN  [x]  Upgrade activities as tolerated     [x]  Continue plan of care  []  Update interventions per flow sheet       []  Discharge due to:_  [x]  Other: cont per PN    Radha November, PTA  5/3/2019

## 2019-05-10 ENCOUNTER — HOSPITAL ENCOUNTER (OUTPATIENT)
Dept: PHYSICAL THERAPY | Age: 59
Discharge: HOME | End: 2019-05-10
Payer: COMMERCIAL

## 2019-05-10 PROCEDURE — 97110 THERAPEUTIC EXERCISES: CPT

## 2019-05-10 PROCEDURE — 97140 MANUAL THERAPY 1/> REGIONS: CPT

## 2019-05-10 PROCEDURE — 97016 VASOPNEUMATIC DEVICE THERAPY: CPT

## 2019-05-10 NOTE — PROGRESS NOTES
PT DAILY TREATMENT NOTE 8-    Patient Name: Stacia Hammonds  Date:5/10/2019  : 1960  [x]  Patient  Verified  Payor: Olvin Llamas / Plan[facility] Product Type: Commerical /    In time: 9:28 am    Out time: 10:47 am  Total Treatment Time (min): 79  Visit #: 4 of 4-6    Treatment Area: Knee pain, right [M25.561]  Acute postoperative pain [G89.18]    SUBJECTIVE  Pain Level (0-10 scale): 0 \"stiffness\"  Any medication changes, allergies to medications, adverse drug reactions, diagnosis change, or new procedure performed?: [x] No    [] Yes (see summary sheet for update)  Subjective functional status/changes:   [] No changes reported  \"I am good, just stiff from work. I have a lot of swelling after work since I can't sit much, but ice and elevate when I am home. Ina Humberto \"    OBJECTIVE  Modality rationale: decrease inflammation and decrease pain to improve the patients ability to ambulate, stairs , squat   Min Type Additional Details   10 [x] Vasopneumatic Device Position: semi-reclined  Location: (R) knee  Low temperature, medium compression   [x] Skin assessment post-treatment:  [x]intact []redness- no adverse reaction       []redness - adverse reaction:     39 min Therapeutic Exercise:  [x] See flow sheet: initiated RDLs, solorio hip lift, S/L hip ADD   Rationale: increase ROM, increase strength and improve coordination to improve the patients ability to ambulate, stairs, drive, dressing    10 min Manual Therapy: retro massage with LE elevated on mat; PNF CR hamstring and calf stretching; supine manual OP for extension   Rationale: decrease pain, increase ROM and increase tissue extensibility to improve movement for gait, ADLs, stairs         X min Patient Education: [x] Review HEP     Other Objective/Functional Measures:   Min VC's req for level pelvis with RDLs, however, pt notes mild ER is normal for ice skating.   Hip ABD strength:  4+/5   Glut med: 4/5    Pain Level (0-10 scale) post treatment: 0    ASSESSMENT/Changes in Function:   Patient has met LTG#2. Notable pitting edema, therefore, reinitiated VASO to address. Lacking 3 deg knee ext AROM after self-stretching due to long hx of knee flexion contracture, but pt demos functional (I) gait. Notes she did RDLs with  earlier this week to prep for return to ice skating (*when cleared by MD*) with less difficulty after last session. Patient will continue to benefit from skilled PT services to modify and progress therapeutic interventions, address functional mobility deficits, address ROM deficits, address strength deficits, analyze and address soft tissue restrictions, analyze and cue movement patterns, analyze and modify body mechanics/ergonomics, assess and modify postural abnormalities, address imbalance/dizziness and instruct in home and community integration to attain remaining goals. []  See Plan of Care  [x]  See progress note/recertification (8/49/18)  []  See Discharge Summary         Progress towards goals / Updated goals:  New Goals to be achieved in __4-6__  treatments:  1. Patient will improve right knee EXT AROM to 0 deg to promote normalized gait pattern. -Goal progressing; 1 deg knee EXT AROM after stretching and manual (5/3/19)  2. Patient will demo 4+/5 hip ABD strength to improve pelvic stability for stair descent. -Goal progressing; pt demos 4+/5 hip ABD strength (5/10/19)  3. Increase FOTO to 62 indicating improved function and quality of life. 4.  Patient will demo foam SLS to 30\" to indicate proper LE stability for progression towards return to ice skating. 30 sec  x3 reps  Goal met 4/26/19.       PLAN  [x]  Upgrade activities as tolerated     [x]  Continue plan of care  []  Update interventions per flow sheet       []  Discharge due to:_  [x]  Other: cont per PN    Ok Cervantes, PTA  5/10/2019

## 2019-05-13 ENCOUNTER — HOSPITAL ENCOUNTER (OUTPATIENT)
Dept: PHYSICAL THERAPY | Age: 59
Discharge: HOME | End: 2019-05-13
Payer: COMMERCIAL

## 2019-05-13 PROCEDURE — 97140 MANUAL THERAPY 1/> REGIONS: CPT

## 2019-05-13 PROCEDURE — 97110 THERAPEUTIC EXERCISES: CPT

## 2019-05-13 PROCEDURE — 97016 VASOPNEUMATIC DEVICE THERAPY: CPT

## 2019-05-13 NOTE — PROGRESS NOTES
PT DAILY TREATMENT NOTE 8-    Patient Name: Keo Miranda  Date:2019  : 1960  [x]  Patient  Verified  Payor: [place] / Plan: [place] [employer]  [facility] Type: Commerical /    In time:8:24  Out time:9:40  Total Treatment Time (min): 76  Total Timed Codes (min): 66  1:1 Treatment Time (min): 66   Visit #: 5 of 4-6    Treatment Area: Knee pain, right [M25.561]  Acute postoperative pain [G89.18]    SUBJECTIVE  Pain Level (0-10 scale): 0  Any medication changes, allergies to medications, adverse drug reactions, diagnosis change, or new procedure performed?: [x] No    [] Yes (see summary sheet for update)  Subjective functional status/changes:   [] No changes reported  Improvement: going up stairs, extension (worked on it a bunch this weekend), 30 min on TM at gym with resulting stiffness/no pain   Deficits: need to concentrate on descending stairs with eccentric control; swelling at the end of the day      Pt notes some point tenderness on the tibia just below the tib tuberosity   Still working with a  twice a week. Working on weight shifting.      OBJECTIVE  Modality rationale: decrease edema, decrease inflammation and decrease pain to improve the patients ability to climb stairs, ambulate, squat    Min Type Additional Details   10 [x]  Vasopneumatic Device Pressure:       [] lo [x] med [] hi   Temperature: [x] lo [] med [] hi   [x] Skin assessment post-treatment:  [x]intact []redness- no adverse reaction       []redness - adverse reaction:       54 (49) min Therapeutic Exercise:  [x] See flow sheet : initiated lateral step downs for stair control , SLS on black side of bosu, SL RDL    Rationale: increase ROM, increase strength and improve coordination to improve the patients ability to ambulate, stairs, squat, dress, work     12 min Manual Therapy:   PNF CR hamstring and calf stretching; supine manual OP for extension    NI today:   Retro massage    Rationale: decrease pain, increase ROM, increase tissue extensibility and decrease edema  to improve gait, ADLs, mobility           x min Patient Education: [x] Review HEP    [x] Progressed/Changed HEP based on:  Lateral step down for quad control   [] positioning   [] body mechanics   [] transfers   [] heat/ice application        Other Objective/Functional Measures:   AAROM R knee extensio. Progressing wtll n:  1 deg from ext   TTP middle tibial crest, no bruising or redness  TTP proximal scar  VC for explosive extension movements with RDL's    Pain Level (0-10 scale) post treatment: 0    ASSESSMENT/Changes in Function: good scar mobility but with TTP proximal scar. Progressing well and maintaining extension mobility although lacking 1 deg from neutral. Pt maintains about 25 deg flexion of the knees during glute prep with good mechanics at the knee. Patient will continue to benefit from skilled PT services to modify and progress therapeutic interventions, address functional mobility deficits, address ROM deficits, address strength deficits, analyze and address soft tissue restrictions, analyze and cue movement patterns, analyze and modify body mechanics/ergonomics, assess and modify postural abnormalities and address imbalance/dizziness to attain remaining goals. []  See Plan of Care  []  See progress note/recertification  []  See Discharge Summary         Progress towards goals / Updated goals:  New Goals to be achieved in __4-6__  treatments:  1.  Patient will improve right knee EXT AROM to 0 deg to promote normalized gait pattern. -Goal progressing; 1 deg knee EXT AROM after stretching and manual (5/13/19)  2.  Patient will demo 4+/5 hip ABD strength to improve pelvic stability for stair descent. -Goal progressing; pt demos 4+/5 hip ABD strength (5/10/19)  3.  Increase FOTO to 62 indicating improved function and quality of life.   4.  Patient will demo foam SLS to 30\" to indicate proper LE stability for progression towards return to ice skating. 30 sec  x3 reps  Goal met 4/26/19.     PLAN  [x]  Upgrade activities as tolerated     []  Continue plan of care  []  Update interventions per flow sheet       []  Discharge due to:_  [x]  Other:_anticipated D/C LEO Ibarra, PT 5/13/2019  8:04 AM

## 2019-05-17 ENCOUNTER — HOSPITAL ENCOUNTER (OUTPATIENT)
Dept: PHYSICAL THERAPY | Age: 59
Discharge: HOME | End: 2019-05-17
Payer: COMMERCIAL

## 2019-05-17 PROCEDURE — 97140 MANUAL THERAPY 1/> REGIONS: CPT

## 2019-05-17 PROCEDURE — 97016 VASOPNEUMATIC DEVICE THERAPY: CPT

## 2019-05-17 PROCEDURE — 97110 THERAPEUTIC EXERCISES: CPT

## 2019-05-17 NOTE — PROGRESS NOTES
PT DAILY TREATMENT NOTE 8-14    Patient Name: Paige Johansen  Date:2019  : 1960  [x]  Patient  Verified  Payor: Tania Eastman [facility] Product Type: [facility] /    In time: 8:30 am            Out time: 9:34 am  Total Treatment Time (min): 64  Visit #: 6 of 4-6    Treatment Area: Knee pain, right [M25.561]  Acute postoperative pain [G89.18]    SUBJECTIVE  Pain Level (0-10 scale): 0  Any medication changes, allergies to medications, adverse drug reactions, diagnosis change, or new procedure performed?: [x] No    [] Yes (see summary sheet for update)  Subjective functional status/changes:   [] No changes reported  \"Doing good, just always swollen after work. \"    OBJECTIVE  Modality rationale: decrease edema, decrease inflammation and decrease pain to improve the patients ability to climb stairs, ambulate, squat    Min Type Additional Details   10 [x]  Vasopneumatic Device Pressure:       [] lo [x] med [] hi   Temperature: [x] lo [] med [] hi   [x] Skin assessment post-treatment:  [x]intact []redness- no adverse reaction       []redness - adverse reaction:       43 min Therapeutic Exercise:  [x] See flow sheet: PT tech assisted pt with FOTO completion   Rationale: increase ROM, increase strength and improve coordination to improve the patients ability to ambulate, stairs, squat, dress, work     11 min Manual Therapy:  Reassessment; (R) LE retro massage    Rationale: decrease pain, increase ROM, increase tissue extensibility and decrease edema  to improve gait, ADLs, mobility           X min Patient Education: [x] Review HEP     Other Objective/Functional Measures:   Improvements: improved gait quality (\"pretty much walking normal\"), perceived balance, knee mobility and strength, stair ascent, prolonged walking for work duties  Deficits: stair descent, ice skating (not yet cleared by MD, has been cautioned by PTA to avoid at this time)  FOTO score: 72/100 (at last assessment, 61/100)  (R) knee A/PROM: 1-122 deg  (R) knee strength: 5/5 within available ROM  (R) hip strength: flex 4+/5, abd/add 4+/5 (glut med 4/5), ext 5/5 (glut max 5//5)  Core strength: 100% bridge  Balance, BOSU SLS: 30\"  Pain: (B) 0, (W) 1-2 - achiness versus pain    Pain Level (0-10 scale) post treatment: 0    ASSESSMENT/Changes in Function:   See PN / DC Planning. Patient will continue to benefit from skilled PT services to modify and progress therapeutic interventions, address functional mobility deficits, address ROM deficits, address strength deficits, analyze and address soft tissue restrictions, analyze and cue movement patterns, analyze and modify body mechanics/ergonomics, assess and modify postural abnormalities and address imbalance/dizziness to attain remaining goals. []  See Plan of Care  [x]  See progress note/recertification (9/49/12)   []  See Discharge Summary         Progress towards goals / Updated goals:  1.  Patient will improve right knee EXT AROM to 0 deg to promote normalized gait pattern. -Goal progressing; 1 deg knee EXT AROM after stretching and manual  2.  Patient will demo 4+/5 hip ABD strength to improve pelvic stability for stair descent. -Goal met; pt demos 4+/5 hip ABD strength  3.  Increase FOTO to 62 indicating improved function and quality of life. -Goal met; inc to 72/100  4.  Patient will demo foam SLS to 30\" to indicate proper LE stability for progression towards return to ice skating.  30 sec  x3 reps  -Goal met; pt able to perform BOSU SLS 30\" x 3    PLAN  [x]  Upgrade activities as tolerated     [x]  Continue plan of care  []  Update interventions per flow sheet       []  Discharge due to:_  [x]  Other: see PN; cont for 2 treatments next week, then D/C to achieve full knee EXT     Devante Linda, PTA 5/17/2019

## 2019-05-17 NOTE — PROGRESS NOTES
[address] [facility] AT [address] [place], [place]  Phone: [phone] Fax: [phone]  PROGRESS NOTE  Patient Name: Ranjana Fitzpatrick : 1960   Treatment/Medical Diagnosis: Knee pain, right [M25.561]  Acute postoperative pain [G89.18]   Referral Source: Gemma Torres MD     Date of Initial Visit: 19 Attended Visits: 30 Missed Visits: 0     SUMMARY OF TREATMENT  Patient is s/p R TKA due to modified Sindi procedure that increased knee pain and caused OA (per pt report).  DOS . Treatment has included therapeutic exercise, aggressive stretching and manual treatment, and VASO ice machine for swelling.     CURRENT STATUS  Patient continues to make good progress in PT. Assessment as follows:  Improvements: improved gait quality (\"pretty much walking normal\"), perceived balance, knee mobility and strength, stair ascent, prolonged walking for work duties  Deficits: stair descent, ice skating (not yet cleared by MD, has been cautioned by PTA to avoid at this time)  FOTO score: 72/100 (at last assessment, 61/100)  (R) knee A/PROM: 1-122 deg  (R) knee strength: 5/5 within available ROM  (R) hip strength: flex 4+/5, abd/add 4+/5 (glut med 4/5), ext 5/5 (glut max 5//5)  Core strength: 100% bridge  Balance, BOSU SLS: 30\"  Pain: (B) 0, (W) 1-2 - achiness versus pain    Goal/Measure of Progress   1.  Patient will improve right knee EXT AROM to 0 deg to promote normalized gait pattern. -Goal progressing; 1 deg knee EXT AROM after stretching and manual  2.  Patient will demo 4+/5 hip ABD strength to improve pelvic stability for stair descent. -Goal met; pt demos 4+/5 hip ABD strength  3.  Increase FOTO to 62 indicating improved function and quality of life. -Goal met; inc to 72/100  4.  Patient will demo foam SLS to 30\" to indicate proper LE stability for progression towards return to ice skating.  30 sec  x3 reps  -Goal met; pt able to perform BOSU SLS 30\" x 3    New Goals to be achieved in __2__  treatments:  1. Patient will improve right knee EXT AROM to 0 deg to promote normalized gait pattern.     RECOMMENDATIONS  Recommend cont skilled PT at 2x1 for 2 treatments to address deficits and achieve stated goals. If you have any questions/comments please contact us directly at [phone]. Thank you for allowing us to assist in the care of your patient. LPTA Signature: Rose Vegas  Date: 5/17/2019   PT Signature: Yanet Kinney DPT  Time: 1:15 PM   NOTE TO PHYSICIAN:  PLEASE COMPLETE THE ORDERS BELOW AND FAX TO   [facility]: [phone]. If you are unable to process this request in 24 hours please contact our office: [phone].  ___ I have read the above report and request that my patient continue as recommended.   ___ I have read the above report and request that my patient continue therapy with the following changes/special instructions:_________________________________________________________   ___ I have read the above report and request that my patient be discharged from therapy.      Physician Signature:        Date:       Time:

## 2019-05-20 ENCOUNTER — HOSPITAL ENCOUNTER (OUTPATIENT)
Dept: PHYSICAL THERAPY | Age: 59
Discharge: HOME | End: 2019-05-20
Payer: COMMERCIAL

## 2019-05-20 PROCEDURE — 97140 MANUAL THERAPY 1/> REGIONS: CPT

## 2019-05-20 PROCEDURE — 97016 VASOPNEUMATIC DEVICE THERAPY: CPT

## 2019-05-20 PROCEDURE — 97110 THERAPEUTIC EXERCISES: CPT

## 2019-05-20 NOTE — PROGRESS NOTES
PT DAILY TREATMENT NOTE 8-14    Patient Name: Stacia Hammonds  Date:2019  : 1960  [x]  Patient  Verified  Payor: Olvin Llamas / Plan: [facility]  [facility] Product Type: Commerical /    In time: 12:01pm           Out time: 1:03pm  Total Treatment Time (min): 62  Visit #: 1 of 2    Treatment Area: Knee pain, right [M25.561]  Acute postoperative pain [G89.18]    SUBJECTIVE  Pain Level (0-10 scale): 1-2  Any medication changes, allergies to medications, adverse drug reactions, diagnosis change, or new procedure performed?: [x] No    [] Yes (see summary sheet for update)  Subjective functional status/changes:   [] No changes reported  \"It's doing good today, less swelling, but I rested all weekend. \"    OBJECTIVE  Modality rationale: decrease edema, decrease inflammation and decrease pain to improve the patients ability to climb stairs, ambulate, squat    Min Type Additional Details   10 [x]  Vasopneumatic Device Pressure:       [] lo [x] med [] hi   Temperature: [x] lo [] med [] hi   [x] Skin assessment post-treatment:  [x]intact []redness- no adverse reaction       []redness - adverse reaction:       41 min Therapeutic Exercise:  [x] See flow sheet: added sidestepping to inc glut strength, progressed lateral tap downs   Rationale: increase ROM, increase strength and improve coordination to improve the patients ability to ambulate, stairs, squat, dress, work     11 min Manual Therapy:  PNF CR hamstring and calf stretching f/b knee joint mobs for extension   Rationale: decrease pain, increase ROM, increase tissue extensibility and decrease edema  to improve gait, ADLs, mobility           X min Patient Education: [x] Review HEP     Other Objective/Functional Measures:   Knee EXT AROM: 1 deg     Pain Level (0-10 scale) post treatment: 0    ASSESSMENT/Changes in Function:   Slow progress with increasing EXT AROM with chiefly posterior chain stiffness (hamstring>calf).     Patient will continue to benefit from skilled PT services to modify and progress therapeutic interventions, address functional mobility deficits, address ROM deficits, address strength deficits, analyze and address soft tissue restrictions, analyze and cue movement patterns, analyze and modify body mechanics/ergonomics, assess and modify postural abnormalities and address imbalance/dizziness to attain remaining goals. []  See Plan of Care   [x]  See progress note/recertification (2/43/46)   []  See Discharge Summary         Progress towards goals / Updated goals:  1.   Patient will improve right knee EXT AROM to 0 deg to promote normalized gait pattern. -Goal progressing; knee EXT AROM 1 deg (5/20/19)    PLAN  [x]  Upgrade activities as tolerated     [x]  Continue plan of care  []  Update interventions per flow sheet       []  Discharge due to:_  [x]  Other: reassess goals NV for D/C; finalize HEP with emphasis on ecc quad and glut strength    Nicole Rice, PTA 5/20/2019

## 2019-05-24 ENCOUNTER — HOSPITAL ENCOUNTER (OUTPATIENT)
Dept: PHYSICAL THERAPY | Age: 59
Discharge: HOME | End: 2019-05-24
Payer: COMMERCIAL

## 2019-05-24 PROCEDURE — 97016 VASOPNEUMATIC DEVICE THERAPY: CPT

## 2019-05-24 PROCEDURE — 97140 MANUAL THERAPY 1/> REGIONS: CPT

## 2019-05-24 PROCEDURE — 97110 THERAPEUTIC EXERCISES: CPT

## 2019-05-24 NOTE — PROGRESS NOTES
[address] [facility] [address] [facility] AT [address] [place]     Phone: [phone] Fax: [phone] SUMMARY  Patient Name: Blossom Callahan : 1960   Treatment/Medical Diagnosis: Knee pain, right [M25.561]  Acute postoperative pain [G89.18]   Referral Source: Saray Petty MD     Date of Initial Visit: 19 Attended Visits: 32 Missed Visits: 0     SUMMARY OF TREATMENT  Patient is s/p R TKA due to modified Sindi procedure that increased knee pain and caused OA (per pt report).  DOS . Treatment has included therapeutic exercise, aggressive stretching and manual treatment, and VASO ice machine for swelling.      CURRENT STATUS  Patient has made excellent progress in PT as evidenced by ability to achieve full knee EXT in clinic today. Patient states she has been able to complete all ADLs and work duties with minimal pain and less residual swelling/inflammation since last progress note. Patient has also transitioned to working with her  Lisette Hodges) to transition from PT in attempts to progress strength for return to ice skating (pt has been cautioned to avoid this activity until cleared by MD. Nieves Calvo of Progress:  1.  Patient will improve right knee EXT AROM to 0 deg to promote normalized gait pattern. -Goal met; 0 deg knee EXT AROM after manual and stretching    Home exercise program established on initial evaluation and progressed as patient is able to address deficits. Patient now has all of the knowledge to continue with progress per HEP. RECOMMENDATIONS  Discontinue therapy. Progressing towards or have reached established goals. If you have any questions/comments please contact us directly at [phone]. Thank you for allowing us to assist in the care of your patient.     LPTA Signature: Rose Valdez Date: 19   Therapist Signature: Britney Horne DPT  Time: 10:31 AM

## 2019-05-24 NOTE — PROGRESS NOTES
PT DAILY TREATMENT NOTE 8-    Patient Name: Masha Current  Date:2019  : 1960  [x]  Patient  Verified  Payor: Teddy Buitrago / Plan: [place] [employer]  [facility] Product Type: Commerical /    In time: 8:30 am          Out time: 9:32 am  Total Treatment Time (min): 62  Visit #: 2 of 2    Treatment Area: Knee pain, right [M25.561]  Acute postoperative pain [G89.18]    SUBJECTIVE  Pain Level (0-10 scale): 1  Any medication changes, allergies to medications, adverse drug reactions, diagnosis change, or new procedure performed?: [x] No    [] Yes (see summary sheet for update)  Subjective functional status/changes:   [] No changes reported  \"It's just stiff. \"    OBJECTIVE  Modality rationale: decrease edema, decrease inflammation and decrease pain to improve the patients ability to climb stairs, ambulate, squat    Min Type Additional Details   10 [x]  Vasopneumatic Device Pressure:       [] lo [x] med [] hi   Temperature: [x] lo [] med [] hi   [x] Skin assessment post-treatment:  [x]intact []redness- no adverse reaction       []redness - adverse reaction:       29 min Therapeutic Exercise:  [x] See flow sheet: added ecc horizontal and retro slides (using slider board) at // bars to improve ecc quad strength for stairs    Rationale: increase ROM, increase strength and improve coordination to improve the patients ability to ambulate, stairs, squat, dress, work     23 min Manual Therapy:  MFR/STM/DTM to (R) medial hamstrings and calf; PNF CR hamstring and calf stretching f/b knee joint mobs for extension; manual OP for extension   Rationale: decrease pain, increase ROM, increase tissue extensibility and decrease edema  to improve gait, ADLs, mobility           X min Patient Education: [x] Review HEP     Other Objective/Functional Measures:   Knee EXT AROM, after manual: 0 deg    Pain Level (0-10 scale) post treatment: 0    ASSESSMENT/Changes in Function:   See D/C.     Patient will continue to benefit from skilled PT services to modify and progress therapeutic interventions, address functional mobility deficits, address ROM deficits, address strength deficits, analyze and address soft tissue restrictions, analyze and cue movement patterns, analyze and modify body mechanics/ergonomics, assess and modify postural abnormalities and address imbalance/dizziness to attain remaining goals. [x]  See Discharge Summary         Progress towards goals / Updated goals:  1.   Patient will improve right knee EXT AROM to 0 deg to promote normalized gait pattern. -Goal met; 0 deg knee EXT AROM after manual and stretching    PLAN   [x]  Discharge due to: patient meeting goals    Rehana Rodriguez, PTA 5/24/2019

## 2022-08-03 ENCOUNTER — HOSPITAL ENCOUNTER (OUTPATIENT)
Dept: PHYSICAL THERAPY | Age: 62
Discharge: HOME | End: 2022-08-03
Payer: COMMERCIAL

## 2022-08-03 PROCEDURE — 97161 PT EVAL LOW COMPLEX 20 MIN: CPT

## 2022-08-03 NOTE — PROGRESS NOTES
PT DAILY TREATMENT NOTE     Patient Name: Loly Padilla  Date:8/3/2022  : 1960  [x]  Patient  Verified  Payor: [place] / Plan: [employer] / Product Type: HMO /    In time:12:21  Out time:1:11  Total Treatment Time (min): 50  Visit #: 1 of 10    Medicare/BCBS Only   Total Timed Codes (min):   1:1 Treatment Time:         Treatment Area: Other low back pain [M54.59]    SUBJECTIVE  Pain Level (0-10 scale): 0  Any medication changes, allergies to medications, adverse drug reactions, diagnosis change, or new procedure performed?: [x] No    [] Yes (see summary sheet for update)  Subjective functional status/changes:   [] No changes reported    CC: low back pain, right LE pain/weakness  History/Mechanism of Injury:   Chronic history of intermittent low back pain  2022 - onset of severe low back pain, numbness into lateral, dorsum of foot, foot drop  MRI demo DDD  22- microdiscetomy  Precautions - not able to lift more than 10 lbs, no bending, no twisting   Current Symptoms/Complaints: still decreased right foot strength, still impaired sensation, still feels tight  Pain-  Current: 09/10     Worst: 10/10   Best: 0/10  Aggravated By: bending over too far, trying to pick something up that is too heavy, extra activity  Alleviated By: heating pad, rest  Previous Treatment/Compliance: currently attending [facility] for hip/balance work  Mobility Devices: none  PMHx/Surgical Hx: HTN, asthma, thyroid issues, arthritis  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[employer], 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  Living Situation: lives in two story home difficulty getting clothes out of bottom of drier   Household Modifications:    Hobbies: pure barre, figure skating, weight lifting program  PLOF: functionally independent  Limitations to PLOF: unable to return to pure barre classes,  Pt Goals: return to classess, improved strength    OBJECTIVE    20 min [x]Eval                  []Re-Eval     15 min Therapeutic Exercise:  [] See flow sheet :   Rationale: increase ROM and increase strength to improve the patients ability to improve mobility with decreased pain    15 min Therapeutic Activity:  []  See flow sheet : Patient education on therapy assessment, prognosis, expectations for therapy sessions, patient goals, expected recovery following discectomy, guidelines for return to PLOF home program, muscles innervated by L5, and HEP. Rationale: to improve the patients ability to adhere to HEP and therapy sessions for increased compliance when working toward therapy goals.              With   [] TE   [x] TA   [] neuro   [] other: Patient Education: [x] Review HEP    [] Progressed/Changed HEP based on:   [] positioning   [] body mechanics   [] transfers   [] heat/ice application    [] other:      Other Objective/Functional Measures:     Observation: right lumbar paraspinals slightly raised in comparison to left  Palpation: TTP at midline lumbar spine    Lumbar AROM:                                           AROM (% of full)                 Right Left Effect   Flexion 100% pulling   Extension 50% pain   Side Bend 75 75    Rotation 75 75      Ankle Mobility   AROM DF Right 11 deg, left 18 deg  Great Toe Mobility  Right 12 deg, left 65 deg                                               -  Strength:   Right (/5) Left (/5)   Hip     Flexion 4 5             Abduction 4 5             Adduction               Extension 4 4             ER 5 5             IR 4 5   Knee   Extension 4+ 5              Flexion 4 5   Ankle   Dorsiflexion 4 5               PF                 Inversion 4 5               Eversion 4 5   Trunk Flexor Endurance: FWD Plank 53 seconds, Right side plank 43\", left side plank 5\"    Functional Squat: upright spine, increased anterior knee translation    Balance: SLS right 22\" but significant Trendelenberg stance, left 17\"    Reflexes/Sensation: reports continued impaired sensation at dorsum of foot, great toe    Pain Level (0-10 scale) post treatment: 0    ASSESSMENT/Changes in Function: See POC    Patient will continue to benefit from skilled PT services to modify and progress therapeutic interventions, address functional mobility deficits, address ROM deficits, address strength deficits, analyze and address soft tissue restrictions, analyze and cue movement patterns, analyze and modify body mechanics/ergonomics, assess and modify postural abnormalities, address imbalance/dizziness and instruct in home and community integration to attain remaining goals.      [x]  See Plan of Care  []  See progress note/recertification  []  See Discharge Summary         Progress towards goals / Updated goals:  See POC    PLAN  [x]  Upgrade activities as tolerated     []  Continue plan of care  [x]  Update interventions per flow sheet       []  Discharge due to:_  []  Other:_      Samreen Trejo 8/3/2022  9:10 AM    Future Appointments   Date Time Provider Michael Orosco   8/3/2022 12:15 PM Rebecca Stack [facility]

## 2022-08-03 NOTE — PROGRESS NOTES
[address] PHYSICAL THERAPY AT [facility]   [id number] [place] [address] [address] [facility], [place] [address]  Phone: [phone] Fax: [phone] / [address] [place] [address] PHYSICAL THERAPY SERVICES  Patient Name: Yeimy Mullins : 1960   Medical   Diagnosis: Other low back pain [M54.59] Treatment Diagnosis: Low back pain   Onset Date: 22 (DOS)     Referral Source: Rachael Mccartney MD [facility]): 8/3/2022   Prior Hospitalization: See medical history Provider #: [id number]   Prior Level of Function: Functionally independent, works as nurse at [employer], enjoys ice dancing/pure barre classes   Comorbidities: HTN, thyroid issues, asthma, arthritis, scoliosis   Medications: Verified on Patient Summary List   The Plan of Care and following information is based on the information from the initial evaluation.     ========================================================================    Assessment / key information:  Pt is a pleasant 63 y.o. female who presents with c/o low back pain following lumbar microdiscectomy on 22. The patient reports an acute onset of low back pain with associated right LE numbness/weakness in 2022. Conservative management failed to reduce symptoms and pt developed right drop foot during gait, leading to decision to address pt's L5 nerve root impingement with microdiscetomy on 22. Pt's symptoms have improved since her surgery but continues to demonstrate decreased strength throughout the right LE which affects her ability to return to PLOF exercise classes and ice skating hobbies. Current precautions at eval per pt- no lifting above 10 lbs, no bending, no twisting. Functional deficits include: decreased right LE strength, decreased sensation in right foot, decreased SLS B, and impaired lifting abilities that restrict return to work. Rehab potential is good due to high motivation to achieve PLOF.  Pt would benefit from skilled PT to address above deficits to improve Pt's function and ability to return to PLOF with improved strength.      ========================================================================    Eval Complexity: History: HIGH Complexity :3+ comorbidities / personal factors will impact the outcome/ POC Exam:MEDIUM Complexity : 3 Standardized tests and measures addressing body structure, function, activity limitation and / or participation in recreation  Presentation: LOW Complexity : Stable, uncomplicated  Clinical Decision Making:MEDIUM Complexity : FOTO score of 26-74Overall Complexity:LOW   Problem List: pain affecting function, decrease ROM, decrease strength, impaired gait/ balance, decrease ADL/ functional abilitiies, decrease activity tolerance, decrease flexibility/ joint mobility, and decrease transfer abilities   Treatment Plan may include any combination of the following: Therapeutic exercise, Therapeutic activities, Neuromuscular re-education, Physical agent/modality, Gait/balance training, Manual therapy, Aquatic therapy, Patient education, Self Care training, Functional mobility training, Home safety training, and Stair training  Patient / Family readiness to learn indicated by: asking questions and trying to perform skills    Persons(s) to be included in education: patient (P)  Barriers to Learning/Limitations: None  Measures taken:    Patient Goal (s): \"Regain full range of motion and strength\"   Patient self reported health status: good  Rehabilitation Potential: good    GOALS-  Short Term Goals: To be accomplished in 1 week  - Goal: Pt to be compliant with initial HEP to improve ability to normalize gait and improve ankle/toe strength and mobility. Status at last note/certification: Established and reviewed with Pt  Long Term Goals: To be accomplished in 10 treatments  - Goal: Pt to maintain forward plank for at least 90 seconds to demo improved core stability.   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 Status at last note/certification: has not initiated lifting yet, not able to lift > 10 lbs per precautions at this time  - Goal: Pt to demo at least 50 deg of right great toe AAROM to improve ease with toe off phase of gait. Status at last note/certification: 12 deg  - Goal: Pt to demo SLS balance of at least 30\" B to improve safety during PLOF exercise classes. Status at last note/certification: right 22\", left 17\"  - Goal: Pt to demonstrate 5/5 right hip abduction MMT to increase ease of prolonged standing. Status at last note/certification: 4/5  - Goal: Pt to report FOTO score of at least 67 pts to demonstrate improved function and quality of life. Status at last note/certification: FOTO 60 pts       Frequency / Duration:   -Patient to be seen  2  times per week for 10  treatments:     Patient / Caregiver education and instruction: activity modification and exercises    Therapist Signature: Marty Gold Date: 9/8/0994   Certification Period:  Time: 9:10 AM   ========================================================================  I certify that the above Physical Therapy Services are being furnished while the patient is under my care. I agree with the treatment plan and certify that this therapy is necessary. Physician Signature:        Date:       Time: ___                                            Sabrina Madrigal MD.    Please sign and return to [facility] or you may fax the signed copy to [phone]. Thank you.

## 2022-08-10 ENCOUNTER — HOSPITAL ENCOUNTER (OUTPATIENT)
Dept: PHYSICAL THERAPY | Age: 62
Discharge: HOME | End: 2022-08-10
Payer: COMMERCIAL

## 2022-08-10 PROCEDURE — 97530 THERAPEUTIC ACTIVITIES: CPT

## 2022-08-10 PROCEDURE — 97110 THERAPEUTIC EXERCISES: CPT

## 2022-08-10 PROCEDURE — 97112 NEUROMUSCULAR REEDUCATION: CPT

## 2022-08-10 NOTE — PROGRESS NOTES
PT DAILY TREATMENT NOTE     Patient Name: Taylor Ordonez  Date:8/10/2022  : 1960  [x]  Patient  Verified  Payor: Russel Monreal / Plan: [employer] / Product Type: HMO /    In time: 12:15 pm        Out time: 1:00 pm  Total Treatment Time (min): 45  Total Timed Codes (min): 45  1:1 Treatment Time (min): 45   Visit #: 2 of 10    Treatment Area: Other low back pain [M54.59]    SUBJECTIVE  Pain Level (0-10 scale): 0  Any medication changes, allergies to medications, adverse drug reactions, diagnosis change, or new procedure performed?: [x] No    [] Yes (see summary sheet for update)  Subjective functional status/changes:   [] No changes reported  \"I have trouble with the one on your knees. \"    OBJECTIVE  Modality rationale: PD     Min Type Additional Details    [] Estim: []Att   []Unatt                  []IFC  []Premod                                            []NMES    []Other:  []w/ice   []w/heat  Position:  Location:    []  Traction: [] Cervical       [] Lumbar                       [] Supine        [] Prone                       []Intermittent   []Continuous Lbs:  [] before manual  [] after manual    []  Ultrasound: []Continuous   [] Pulsed                           []1MHz   []3MHz Location:  W/cm2:    []  Iontophoresis with dexamethasone         Location: [] Take home patch   [] In clinic    []  Ice     []  heat  []  Ice massage Position:  Location:    []  Vasopneumatic Device  If using vaso (only need to measure limb vaso being performed on)      pre-treatment girth :       post-treatment girth :       measured at (landmark location)  Pressure: [] lo [] med [] hi   Temp: [] lo [] med [] hi   [] Skin assessment post-treatment:  []intact    []redness- no adverse reaction                                                                                 []redness - adverse reaction:     13 min Therapeutic Exercise:  [x] See flow sheet: initiated per IE   Rationale: increase ROM, increase strength, and improve coordination to improve the patients ability to perform ADLs     17 min Therapeutic Activity:  [x]  See flow sheet: initiated per IE   Rationale: increase strength, improve coordination, improve balance, and increase proprioception  to improve the patients ability to squat, transfer     12 min Neuromuscular Re-education:  [x]  See flow sheet: initiated per IE   Rationale: increase strength, improve coordination, improve balance, and increase proprioception  to improve the patients ability to improve gait quality    3 min Manual Therapy:  prone with one pillow under pelvis/chest scar massage to the L/S   Rationale:  decrease scar adhesions  to improve the patients ability to improve supine sleeping tolerance. The manual therapy interventions were performed at a separate and distinct time from the therapeutic activities interventions. with TE min Patient Education: [x] Review HEP from IE: encouraged it performing side planks at home, to do them with knee flexion and forearm WB'ing modification to improve form     Other Objective/Functional Measures:   STG met. Pain Level (0-10 scale) post treatment: 0    ASSESSMENT/Changes in Function:   Good tolerance to treatment today with patient req 100% verbal/tactile cueing and demo for proper form/technique with all newly introduced therex. Patient will continue to benefit from skilled PT services to modify and progress therapeutic interventions, address functional mobility deficits, address ROM deficits, address strength deficits, analyze and address soft tissue restrictions, analyze and cue movement patterns, analyze and modify body mechanics/ergonomics, assess and modify postural abnormalities, address imbalance/dizziness, and instruct in home and community integration to attain remaining goals.      [x]  See Plan of Care  []  See progress note/recertification  []  See Discharge Summary         Progress towards goals / Updated goals -:  Short Term Goals: To be accomplished in 1 week  - Goal: Pt to be compliant with initial HEP to improve ability to normalize gait and improve ankle/toe strength and mobility. Status at last note/certification: established and reviewed with patient  Current: goal met; pt notes compliance and difficulty with assuming 1/2 kneeling position (8/10/22)  Long Term Goals: To be accomplished in 10 treatments  - Goal: Pt to maintain forward plank for at least 90 seconds to demo improved core stability. 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 Status at last note/certification: has not initiated lifting yet, not able to lift > 10 lbs per precautions at this time  Current: goal progressing; pt able to demo good lifting form with use of 5# hand-weight without back pain (8/10/22)  - Goal: Pt to demo at least 50 deg of right great toe AAROM to improve ease with toe off phase of gait. Status at last note/certification: 12 deg  - Goal: Pt to demo SLS balance of at least 30\" B to improve safety during PLOF exercise classes. Status at last note/certification: right 22\", left 17\"  Current: approx 10 seconds with the knee and hip flexed (8/10/22)  - Goal: Pt to demonstrate 5/5 right hip abduction MMT to increase ease of prolonged standing. Status at last note/certification: 4/5  - Goal: Pt to report FOTO score of at least 67 pts to demonstrate improved function and quality of life.   Status at last note/certification: FOTO 60 pts  (Progress Note due by 9/3/22)    PLAN  [x]  Upgrade activities as tolerated     [x]  Continue plan of care  []  Update interventions per flow sheet       []  Discharge due to:_  [x]  Other: assess response to treatment     Johanna Tyler, PTA 8/10/2022

## 2022-08-12 ENCOUNTER — HOSPITAL ENCOUNTER (OUTPATIENT)
Dept: PHYSICAL THERAPY | Age: 62
Discharge: HOME | End: 2022-08-12
Payer: COMMERCIAL

## 2022-08-12 PROCEDURE — 97530 THERAPEUTIC ACTIVITIES: CPT

## 2022-08-12 PROCEDURE — 97110 THERAPEUTIC EXERCISES: CPT

## 2022-08-12 PROCEDURE — 97112 NEUROMUSCULAR REEDUCATION: CPT

## 2022-08-12 NOTE — PROGRESS NOTES
PT DAILY TREATMENT NOTE     Patient Name: Jasmin Ramos  Date:2022  : 1960  [x]  Patient  Verified  Payor: Siena Oiler / Plan: [employer]iffer Mai HMO / Product Type: HMO /    In time:10:17  Out time:11:05  Total Treatment Time (min): 48  Visit #: 3 of 10    Medicare/BCBS Only   Total Timed Codes (min):   1:1 Treatment Time:         Treatment Area: Other low back pain [M54.59]    SUBJECTIVE  Pain Level (0-10 scale): 2  Any medication changes, allergies to medications, adverse drug reactions, diagnosis change, or new procedure performed?: [x] No    [] Yes (see summary sheet for update)  Subjective functional status/changes:   [] No changes reported  \"A little pulling and pain in my back today, nothing crazy. When can I get back to class? \"    OBJECTIVE      14 min Therapeutic Exercise:  [x] See flow sheet :   Rationale: increase ROM and increase strength to improve LE strength/mobility and  lumbar mobility to improve ease of ADLs and gait. 10 min Therapeutic Activity:  [x]  See flow sheet :   Rationale: increase strength, improve coordination, improve balance, and increase proprioception  to improve the patients ability to perform transfers, stair negotiation, functional lifts, and functional carries. Pt education: advice on returning to [employer] [place] classes, lifting technique     24 min Neuromuscular Re-education:  [x]  See flow sheet :   Rationale: increase strength, improve coordination, improve balance and increase proprioception  to improve the patients ability to perform functional tasks with improved abdominal brace utilization, improved control, and decreased risk for falls.             With   [] TE   [] TA   [] neuro   [] other: Patient Education: [x] Review HEP    [] Progressed/Changed HEP based on:   [] positioning   [] body mechanics   [] transfers   [] heat/ice application    [] other:      Other Objective/Functional Measures: -Added standing hip abduction isometrics, [place] [employer] walkouts, dead bug into extension     Pain Level (0-10 scale) post treatment: 0    ASSESSMENT/Changes in Function: Patient continues to be highly motivated to return to PLOF pure barre classes and ice dancing hobby. Recommended to pt that she continue to develop her core/hip stability before returning to class as she does demonstrate form changes early in her session as she fatigues which would not be ideal for prolonged time period of barre class. Maximal verbal/visual cuing provided throughout session to avoid valgus collapse with squatting and dynamic single leg stability interventions. Patient will continue to benefit from skilled PT services to modify and progress therapeutic interventions, address functional mobility deficits, address ROM deficits, address strength deficits, analyze and address soft tissue restrictions, analyze and cue movement patterns, analyze and modify body mechanics/ergonomics, assess and modify postural abnormalities and address imbalance/dizziness to attain remaining goals. []  See Plan of Care  []  See progress note/recertification  []  See Discharge Summary         Progress towards goals / Updated goals:  Short Term Goals: To be accomplished in 1 week  - Goal: Pt to be compliant with initial HEP to improve ability to normalize gait and improve ankle/toe strength and mobility. Status at last note/certification: established and reviewed with patient  Current: goal met; pt notes compliance and difficulty with assuming 1/2 kneeling position (8/10/22)  Long Term Goals: To be accomplished in 10 treatments  - Goal: Pt to maintain forward plank for at least 90 seconds to demo improved core stability. 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   Status at last note/certification: has not initiated lifting yet, not able to lift > 10 lbs per precautions at this time  Current: goal progressing; pt able to demo good lifting form with use of 5# hand-weight without back pain (8/10/22)  - Goal: Pt to demo at least 50 deg of right great toe AAROM to improve ease with toe off phase of gait. Status at last note/certification: 12 deg  - Goal: Pt to demo SLS balance of at least 30\" B to improve safety during PLOF exercise classes. Status at last note/certification: right 22\", left 17\"  Current: approx 10 seconds with the knee and hip flexed (8/10/22)  - Goal: Pt to demonstrate 5/5 right hip abduction MMT to increase ease of prolonged standing. Status at last note/certification: 4/5  Current: addressing with stand hip abduction isometrics (8/12/22)  - Goal: Pt to report FOTO score of at least 67 pts to demonstrate improved function and quality of life.   Status at last note/certification: FOTO 60 pts  (Progress Note due by 9/3/22)    PLAN  [x]  Upgrade activities as tolerated     [x]  Continue plan of care  []  Update interventions per flow sheet       []  Discharge due to:_  []  Other:_      Joan Orozco 8/12/2022  8:00 AM    Future Appointments   Date Time Provider Michael Orosco   8/12/2022 10:15 AM Renee Cadena [facility]   8/15/2022  8:30 AM Shalom Mclaughlin, PT [facility]   8/18/2022  8:30 AM Renee Cadena [facility]   8/22/2022 12:30 PM Abdi Barraza PT [facility]   8/26/2022  9:30 AM Abdi Barraza PT [facility]   8/29/2022  9:15 AM Renee Cadena [facility]   9/2/2022  9:30 AM Regina Chaves PTA [facility]   9/12/2022 10:00 AM Renee Cadena [facility]   9/20/2022 10:00 AM MAX Gonzalez [facility]   9/23/2022 12:45 PM MAX Gonzalez [facility]

## 2022-08-15 ENCOUNTER — HOSPITAL ENCOUNTER (OUTPATIENT)
Dept: PHYSICAL THERAPY | Age: 62
Discharge: HOME | End: 2022-08-15
Payer: COMMERCIAL

## 2022-08-15 PROCEDURE — 97112 NEUROMUSCULAR REEDUCATION: CPT

## 2022-08-15 PROCEDURE — 97110 THERAPEUTIC EXERCISES: CPT

## 2022-08-15 PROCEDURE — 97530 THERAPEUTIC ACTIVITIES: CPT

## 2022-08-15 NOTE — PROGRESS NOTES
PT DAILY TREATMENT NOTE     Patient Name: Priyanka Reason  Date:8/15/2022  : 1960  [x]  Patient  Verified  Payor: [place] / Plan: [address] [place] HMO / Product Type: HMO /    In time:830 Out [id number]  Total Treatment Time (min): 55  Visit #: 4 of 10        Treatment Area: Other low back pain [M54.59]    SUBJECTIVE  Pain Level (0-10 scale): 4  Any medication changes, allergies to medications, adverse drug reactions, diagnosis change, or new procedure performed?: [x] No    [] Yes (see summary sheet for update)  Subjective functional status/changes:   [] No changes reported  Patient reports increased soreness from helping her dgt move yesterday. Mainly from transfers from low car. OBJECTIVE      10 min Therapeutic Exercise:  [x] See flow sheet :   Rationale: increase ROM and increase strength to improve LE strength/mobility and  lumbar mobility to improve ease of ADLs and gait. 20 min Therapeutic Activity:  [x]  See flow sheet :  Dead lifts  Dead rows   Sit up for transfers   Rationale: increase strength, improve coordination, improve balance, and increase proprioception  to improve the patients ability to perform transfers, stair negotiation, functional lifts, and functional carries. 25 min Neuromuscular Re-education:  [x]  See flow sheet :core stability training    Rationale: increase strength, improve coordination, improve balance and increase proprioception  to improve the patients ability to perform functional tasks with improved abdominal brace utilization, improved control, and decreased risk for falls.             With  rx Patient Education: [x] Review HEP  , return to [facility] [place] precautions , importance of rest days and tissue recovery      Other Objective/Functional Measures:   Plank 90 sec prone    Lateral plank - 2nd set : 25 sec to failure     Hip / core stability  progression :   Added dead lifts   Added dead rows    Added steamboats     Ankle strength progression to decrease fall risk : standing TB ankle DF     Deficits : GT numbness , ankle DF     Pain Level (0-10 scale) post treatment: 0    ASSESSMENT/Changes in Function: Patient reports continued desire to return to [facility] [address]. Initiated exercises that mimic activities ie steamboats in class with good tolerance. Continued VCing for TA contraction. Performed sit ups in accordance with warm up during pure barre class with good tolerance. Patient education on return to [facility] [place] with focus on core engagement and neutral spine. Patient met prone plank goal and most challenged by lateral planks sec to shoulder fatigue. Spoke with patient regarding drop freq to 1x per week pending pure barre tolerance next week. Patient agreeable sec to return to work next week. Patient will continue to benefit from skilled PT services to modify and progress therapeutic interventions, address functional mobility deficits, address ROM deficits, address strength deficits, analyze and address soft tissue restrictions, analyze and cue movement patterns, analyze and modify body mechanics/ergonomics, assess and modify postural abnormalities and address imbalance/dizziness to attain remaining goals. []  See Plan of Care  [x]  See progress note/recertification  []  See Discharge Summary         Progress towards goals / Updated goals:  Short Term Goals: To be accomplished in 1 week  - Goal: Pt to be compliant with initial HEP to improve ability to normalize gait and improve ankle/toe strength and mobility. Status at last note/certification: established and reviewed with patient  Current: goal met; pt notes compliance and difficulty with assuming 1/2 kneeling position (8/10/22)    Long Term Goals: To be accomplished in 10 treatments  - Goal: Pt to maintain forward plank for at least 90 seconds to demo improved core stability.   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 Status at last note/certification: has not initiated lifting yet, not able to lift > 10 lbs per precautions at this time  Current: goal progressing; pt able to demo good lifting form with use of 5# hand-weight without back pain (8/10/22)    - Goal: Pt to demo at least 50 deg of right great toe AAROM to improve ease with toe off phase of gait. Status at last note/certification: 12 deg    - Goal: Pt to demo SLS balance of at least 30\" B to improve safety during PLOF exercise classes. Status at last note/certification: right 22\", left 17\"  Current: approx 10 seconds with the knee and hip flexed (8/10/22)    - Goal: Pt to demonstrate 5/5 right hip abduction MMT to increase ease of prolonged standing. Status at last note/certification: 4/5  Current: addressing with stand hip abduction isometrics (8/12/22)    - Goal: Pt to report FOTO score of at least 67 pts to demonstrate improved function and quality of life.   Status at last note/certification: FOTO 60 pts    (Progress Note due by 9/3/22)    PLAN  [x]  Upgrade activities as tolerated     [x]  Continue plan of care  []  Update interventions per flow sheet       []  Discharge due to:_  [x]  Other:_ assess response to return to to [address] [place] [address], PT 8/15/2022     Future Appointments   Date Time Provider Michael Orosco   8/15/2022  8:30 AM Vassie Peabody., PT [facility]   8/18/2022  8:30 AM Kay Paez [facility]   8/22/2022 12:30 PM Ta Johansen PT [facility]   8/26/2022  9:30 AM Ta Johansen PT [facility]   8/29/2022  9:15 AM Kay Paez [facility]   9/2/2022  9:30 AM Jane Cerrato PTA [facility]   9/12/2022 10:00 AM Kay Paez [facility]   9/20/2022 10:00 AM Jane Cerrato PTA [facility]   9/23/2022 12:45 PM Jane Cerrato PTA [facility]

## 2022-08-18 ENCOUNTER — HOSPITAL ENCOUNTER (OUTPATIENT)
Dept: PHYSICAL THERAPY | Age: 62
Discharge: HOME | End: 2022-08-18
Payer: COMMERCIAL

## 2022-08-18 PROCEDURE — 97112 NEUROMUSCULAR REEDUCATION: CPT

## 2022-08-18 PROCEDURE — 97110 THERAPEUTIC EXERCISES: CPT

## 2022-08-18 PROCEDURE — 97530 THERAPEUTIC ACTIVITIES: CPT

## 2022-08-18 NOTE — PROGRESS NOTES
PT DAILY TREATMENT NOTE     Patient Name: Madie Bird  Date:2022  : 1960  [x]  Patient  Verified  Payor: Guru Pedraza / Plan: [address] [place] [address] [employer] / Product Type: HMO /    In time:8:32  Out time:9:25  Total Treatment Time (min): 53  Visit #: 5 of 10    Medicare/BCBS Only   Total Timed Codes (min):   1:1 Treatment Time:         Treatment Area: Other low back pain [M54.59]    SUBJECTIVE  Pain Level (0-10 scale): 2  Any medication changes, allergies to medications, adverse drug reactions, diagnosis change, or new procedure performed?: [x] No    [] Yes (see summary sheet for update)  Subjective functional status/changes:   [] No changes reported  \"Just some soreness and point tenderness today. I saw the PA yesterday and I was cleared to return to work and to return to activities without limitations. \"    OBJECTIVE    12 min Therapeutic Exercise:  [x] See flow sheet :   Rationale: increase ROM and increase strength to improve LE strength/mobility and  lumbar mobility to improve ease of ADLs and gait. 17 min Therapeutic Activity:  [x]  See flow sheet :   Rationale: increase strength, improve coordination, improve balance, and increase proprioception  to improve the patients ability to perform transfers, stair negotiation, functional lifts, and functional carries. Pt education: dead lift technique, farmer carry technique and purpose     24 min Neuromuscular Re-education:  [x]  See flow sheet :   Rationale: increase strength, improve coordination, improve balance and increase proprioception  to improve the patients ability to perform functional tasks with improved abdominal brace utilization, improved control, and decreased risk for falls.        With   [x] TE   [x] TA   [] neuro   [] other: Patient Education: [x] Review HEP    [] Progressed/Changed HEP based on:   [] positioning   [] body mechanics   [] transfers   [] heat/ice application    [] other:      Other Objective/Functional Measures: -Added skyler albright, [place]     Pain Level (0-10 scale) post treatment: 0    ASSESSMENT/Changes in Function: Patient maintains correct floor<>waist lift technique with increased resistance to 10 lbs and 16 lbs, requiring verbal cues for increased weight shift posterior into feels to encourage gluteal recruitment. 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 Pt to return to work next Tuesday. Patient will continue to benefit from skilled PT services to modify and progress therapeutic interventions, address functional mobility deficits, address ROM deficits, address strength deficits, analyze and address soft tissue restrictions, analyze and cue movement patterns, analyze and modify body mechanics/ergonomics, assess and modify postural abnormalities and address imbalance/dizziness to attain remaining goals. []  See Plan of Care  []  See progress note/recertification  []  See Discharge Summary         Progress towards goals / Updated goals:  Short Term Goals: To be accomplished in 1 week  - Goal: Pt to be compliant with initial HEP to improve ability to normalize gait and improve ankle/toe strength and mobility. Status at last note/certification: established and reviewed with patient  Current: goal met; pt notes compliance and difficulty with assuming 1/2 kneeling position (8/10/22)     Long Term Goals: To be accomplished in 10 treatments  - Goal: Pt to maintain forward plank for at least 90 seconds to demo improved core stability. 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   Status at last note/certification: has not initiated lifting yet, not able to lift > 10 lbs per precautions at this time  Current: goal progressing; pt able to demo good lifting form with use of 5# hand-weight without back pain (8/10/22)     - Goal: Pt to demo at least 50 deg of right great toe AAROM to improve ease with toe off phase of gait. Status at last note/certification: 12 deg     - Goal: Pt to demo SLS balance of at least 30\" B to improve safety during PLOF exercise classes. Status at last note/certification: right 22\", left 17\"  Current: approx 10 seconds with the knee and hip flexed (8/10/22)     - Goal: Pt to demonstrate 5/5 right hip abduction MMT to increase ease of prolonged standing. Status at last note/certification: 4/5  Current: addressing with stand hip abduction isometrics (8/12/22)     - Goal: Pt to report FOTO score of at least 67 pts to demonstrate improved function and quality of life.   Status at last note/certification: FOTO 60 pts    PLAN  [x]  Upgrade activities as tolerated     [x]  Continue plan of care  []  Update interventions per flow sheet       []  Discharge due to:_  []  Other:_      Ya Arellano 8/18/2022  7:38 AM    Future Appointments   Date Time Provider Michael Orosco   8/18/2022  8:30 AM Adamaris Lane [facility]   8/22/2022 12:30 PM Pita Backers, PT [facility]   8/26/2022  9:30 AM Pita Backers, PT [facility]   8/29/2022  9:15 AM [address] [facility]   9/2/2022  9:30 AM Thuy Mathew, PTA [facility]   9/12/2022 10:00 AM [address]aris [address] [facility]   9/20/2022 10:00 AM Thuy Mathew, PTA [facility]   9/23/2022 12:45 PM Thuy Mathew, PTA [facility]

## 2022-08-19 ENCOUNTER — APPOINTMENT (OUTPATIENT)
Dept: PHYSICAL THERAPY | Age: 62
End: 2022-08-19
Payer: COMMERCIAL

## 2022-08-22 ENCOUNTER — HOSPITAL ENCOUNTER (OUTPATIENT)
Dept: PHYSICAL THERAPY | Age: 62
Discharge: HOME | End: 2022-08-22
Payer: COMMERCIAL

## 2022-08-22 PROCEDURE — 97110 THERAPEUTIC EXERCISES: CPT

## 2022-08-22 PROCEDURE — 97112 NEUROMUSCULAR REEDUCATION: CPT

## 2022-08-22 PROCEDURE — 97530 THERAPEUTIC ACTIVITIES: CPT

## 2022-08-22 NOTE — PROGRESS NOTES
PT DAILY TREATMENT NOTE     Patient Name: Madie Bird  Date:2022  : 1960  [x]  Patient  Verified  Payor: Guru Pedraza / Plan: [address] [place] [address] [employer] / Product Type: HMO /    In time:12:33 Out time:1:17  Total Treatment Time (min): 44  Visit #: 6 of 10    Medicare/BCBS Only   Total Timed Codes (min):   1:1 Treatment Time:         Treatment Area: Other low back pain [M54.59]    SUBJECTIVE  Pain Level (0-10 scale): 0  Any medication changes, allergies to medications, adverse drug reactions, diagnosis change, or new procedure performed?: [x] No    [] Yes (see summary sheet for update)  Subjective functional status/changes:   [] No changes reported  \"No pain, just feeling frustrated with how slow the rehab is for this. I did pure barre yesterday for the first time since Surgery and it was fatigued on the R side. I go tonight as well. I also have an ice skating lesson on Friday so we'll really see how it is\". Notes working on her hallux extension. Return to work tomorrow:  feeling ok. 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 OBJECTIVE    16 min Therapeutic Exercise:  [x] See flow sheet :   Rationale: increase ROM and increase strength to improve LE strength/mobility and  lumbar mobility to improve ease of ADLs and gait. 10 min Therapeutic Activity:  [x]  See flow sheet :   Rationale: increase strength, improve coordination, improve balance, and increase proprioception  to improve the patients ability to perform transfers, stair negotiation, functional lifts, and functional carries.   Pt education: dead lift technique, farmer carry technique and purpose     18 min Neuromuscular Re-education:  [x]  See flow sheet :    -changed palloff press walkouts to SLS on outside leg, 3x10  -added curtsy lunges/squat for return to function with ice dancing  -planks (extended arms) on BOSU (flat side) 4x15\"     Rationale: increase strength, improve coordination, improve balance and increase proprioception  to improve the patients ability to perform functional tasks with improved abdominal brace utilization, improved control, and decreased risk for falls. With   [x] TE   [x] TA   [] neuro   [] other: Patient Education: [x] Review HEP    [] Progressed/Changed HEP based on:   [] positioning   [] body mechanics   [] transfers   [] heat/ice application    [] other:     Gave HEP from last visit for resisted ankle DF (has band at home) and standing hip abduction isometric to be done (B)      Other Objective/Functional Measures:  R hallux MTP extension 20 deg;   SLS palloff press: balance limited on the R LE and requires toe touch weight bearing of the L LE  R SLS 17\" with positioning akin to icc skating required (hip ER, torso torsion)    Pain (0-10 scale) post treatment: 0    ASSESSMENT/Changes in Function: Decreased eccentric control in lower ranges of squats (70 to 90 deg knee flexion). Pt very aware of mechanics for squats to avoid genu valgus. Fatigued by box lifts floor to waist, but no c/o pain. Very challenged by SLS palloff press. Min progress towards goal for hallux extension. Patient will continue to benefit from skilled PT services to modify and progress therapeutic interventions, address functional mobility deficits, address ROM deficits, address strength deficits, analyze and address soft tissue restrictions, analyze and cue movement patterns, analyze and modify body mechanics/ergonomics, assess and modify postural abnormalities and address imbalance/dizziness to attain remaining goals. []  See Plan of Care  []  See progress note/recertification  []  See Discharge Summary         Progress towards goals / Updated goals:  Short Term Goals: To be accomplished in 1 week  - Goal: Pt to be compliant with initial HEP to improve ability to normalize gait and improve ankle/toe strength and mobility.   Status at last note/certification: established and reviewed with patient  Current: goal met; pt notes compliance and difficulty with assuming 1/2 kneeling position (8/10/22)     Long Term Goals: To be accomplished in 10 treatments  - Goal: Pt to maintain forward plank for at least 90 seconds to demo improved core stability. 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 Status at last note/certification: has not initiated lifting yet, not able to lift > 10 lbs per precautions at this time  Current: goal progressing; pt progressed to 17.5# with fatigue over 2x5 reps (8/22/22)     - Goal: Pt to demo at least 50 deg of right great toe AAROM to improve ease with toe off phase of gait. Status at last note/certification: 12 deg  Current: slow progerss with 20 deg AROM, PROM 22 deg (8/22/22)     - Goal: Pt to demo SLS balance of at least 30\" B to improve safety during PLOF exercise classes. Status at last note/certification: right 22\", left 17\"  Current: 17 \" max; with R LE held in 'ice skating' position (8/22/22)     - Goal: Pt to demonstrate 5/5 right hip abduction MMT to increase ease of prolonged standing. Status at last note/certification: 4/5  Current: addressing with stand hip abduction isometrics (8/12/22)     - Goal: Pt to report FOTO score of at least 67 pts to demonstrate improved function and quality of life.   Status at last note/certification: FOTO 60 pts    PN due 9/3/22    PLAN  [x]  Upgrade activities as tolerated     [x]  Continue plan of care  []  Update interventions per flow sheet       []  Discharge due to:_  [x]  Other:_  assess response to return to work starting Tuesday 8/23/22    Ever Friend, PT 8/22/2022  7:38 AM    Future Appointments   Date Time Provider Michael Orosco   8/22/2022 12:30 PM Lexis Cavazos Won Hudson, PT [facility]   8/26/2022  9:30 AM Katja Rehman, PT [facility]   8/29/2022  9:15 AM [facility] PT [facility] [address] [facility]   9/2/2022  9:30 AM Jailyn Daniel PTA [facility]   9/12/2022 10:00 AM Vincent Helton [facility]   9/20/2022 10:00 AM Margy Alvarado [facility]   9/23/2022 12:45 PM Jailyn Daniel PTA [facility]

## 2022-08-26 ENCOUNTER — HOSPITAL ENCOUNTER (OUTPATIENT)
Dept: PHYSICAL THERAPY | Age: 62
Discharge: HOME | End: 2022-08-26
Payer: COMMERCIAL

## 2022-08-26 PROCEDURE — 97140 MANUAL THERAPY 1/> REGIONS: CPT

## 2022-08-26 PROCEDURE — 97112 NEUROMUSCULAR REEDUCATION: CPT

## 2022-08-26 PROCEDURE — 97110 THERAPEUTIC EXERCISES: CPT

## 2022-08-26 PROCEDURE — 97530 THERAPEUTIC ACTIVITIES: CPT

## 2022-08-26 NOTE — PROGRESS NOTES
PT DAILY TREATMENT NOTE     Patient Name: Loly Padilla  Date:2022  : 1960  [x]  Patient  Verified  Payor: Danii Chapin / Plan: [employer] / Product Type: HMO /    In time:9:27 Out time:10:21  Total Treatment Time (min): 54  Visit #: 7 of 10    Medicare/BCBS Only   Total Timed Codes (min):   1:1 Treatment Time:         Treatment Area: Other low back pain [M54.59]    SUBJECTIVE  Pain Level (0-10 scale): 3-4  Any medication changes, allergies to medications, adverse drug reactions, diagnosis change, or new procedure performed?: [x] No    [] Yes (see summary sheet for update)  Subjective functional status/changes:   [] No changes reported  Ice skating: will start today and assess next week   Return to work response: \"went well. I had one episode where I turned too fast and got a mm spasm\"  Pain/soreness in the L glutes and lumbar spine     OBJECTIVE    20 min Therapeutic Exercise:  [x] See flow sheet :    -Bent over rows done in staggered stance for sport specific movements  -   Rationale: increase ROM and increase strength to improve LE strength/mobility and  lumbar mobility to improve ease of ADLs and gait. 8 min Therapeutic Activity:  [x]  See flow sheet :    -holland carries: incresad to 20# and VC for upright trunk posture and for toe off the hallux instead of hip  and foot ER to compensate for weakness and immobility in the great toe and ankle    Rationale: increase strength, improve coordination, improve balance, and increase proprioception  to improve the patients ability to perform transfers, stair negotiation, functional lifts, and functional carries.   Pt education: dead lift technique, farmer carry technique and purpose     18 min Neuromuscular Re-education:  [x]  See flow sheet :    -squats done on BOSU for improved LE and core control   -steamboats performed with 1 LE on airex  -planks: modified, tall planks on BOSU (knees on airex)      Rationale: increase strength, improve coordination, improve balance and increase proprioception  to improve the patients ability to perform functional tasks with improved abdominal brace utilization, improved control, and decreased risk for falls. 8 min Manual therapy :    -DTM and TrP release to L glute med and proximal lateral glute max fibers in prone   Rationale: to decrease mm pain and improve gait, sport and decrease DOMS       With   [x] TE   [x] TA   [] neuro   [] other: Patient Education: [x] Review HEP    [] Progressed/Changed HEP based on:   [] positioning   [] body mechanics   [] transfers   [] heat/ice application    [] other:     -TrP with ball to (L) glute med   -standing toe yoga and short foot        Other Objective/Functional Measures:  TrP and TTP in the L glute med and glute max  Returns demo of (L) glute trigger point release with ball   -standing hallux extension added 10x10\" (for HEP)    Pain (0-10 scale) post treatment: 0    ASSESSMENT/Changes in Function:Pt has difficulty with SLS on the R and prefers to toe out, partially due to positions she uses for [employer] [place] and for ice skating but also partially due to lack of hallux extension. Addressed with gait training aimed at proper Terminal stance and standing hallux extension. Patient will continue to benefit from skilled PT services to modify and progress therapeutic interventions, address functional mobility deficits, address ROM deficits, address strength deficits, analyze and address soft tissue restrictions, analyze and cue movement patterns, analyze and modify body mechanics/ergonomics, assess and modify postural abnormalities and address imbalance/dizziness to attain remaining goals. []  See Plan of Care  []  See progress note/recertification  []  See Discharge Summary         Progress towards goals / Updated goals:  Short Term Goals:  To be accomplished in 1 week  - Goal: Pt to be compliant with initial HEP to improve ability to normalize gait and improve ankle/toe strength and mobility. Status at last note/certification: established and reviewed with patient  Current: goal met; pt notes compliance and difficulty with assuming 1/2 kneeling position (8/10/22)     Long Term Goals: To be accomplished in 10 treatments  - Goal: Pt to maintain forward plank for at least 90 seconds to demo improved core stability. 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 Status at last note/certification: has not initiated lifting yet, not able to lift > 10 lbs per precautions at this time  Current: goal progressing; pt progressed to 17.5# with fatigue over 2x5 reps (8/22/22)     - Goal: Pt to demo at least 50 deg of right great toe AAROM to improve ease with toe off phase of gait. Status at last note/certification: 12 deg  Current: slow progress with 20 deg AROM, PROM 22 deg (8/22/22); addressed with standing hallux extension and gait training (8/26/22)     - Goal: Pt to demo SLS balance of at least 30\" B to improve safety during PLOF exercise classes. Status at last note/certification: right 22\", left 17\"  Current: 17 \" max; with R LE held in 'ice skating' position (8/22/22); addressed with airex interventions, standing hallux extension and toe yoga (8/26/22)     - Goal: Pt to demonstrate 5/5 right hip abduction MMT to increase ease of prolonged standing. Status at last note/certification: 4/5  Current: addressing with stand hip abduction isometrics (8/12/22)     - Goal: Pt to report FOTO score of at least 67 pts to demonstrate improved function and quality of life.   Status at last note/certification: FOTO 60 pts  Current: progressing with successful return to work, pure barre (8/26/22)    PN due 9/3/22    PLAN  [x] Upgrade activities as tolerated     [x]  Continue plan of care  []  Update interventions per flow sheet       []  Discharge due to:_  [x]  Other:_ progressed and modify balance exercises prn to load the hallux for improved balance reactions     Pete Desai, PT 8/26/2022  7:38 AM    Future Appointments   Date Time Provider Michael Orosco   8/26/2022  9:30 AM Emy Petit, PT [facility]   8/29/2022  9:15 AM [facility] PT [facility] [address] [facility]   9/2/2022  9:30 AM Rupal Goldstein, PTA [facility]   9/12/2022 10:00 AM Abel Ritter [facility]   9/20/2022 10:00 AM Rupal Goldstein, MAX [facility]   9/23/2022 12:45 PM Rupal Goldstein, MAX [facility]

## 2022-08-29 ENCOUNTER — HOSPITAL ENCOUNTER (OUTPATIENT)
Dept: PHYSICAL THERAPY | Age: 62
Discharge: HOME | End: 2022-08-29
Payer: COMMERCIAL

## 2022-08-29 PROCEDURE — 97530 THERAPEUTIC ACTIVITIES: CPT

## 2022-08-29 PROCEDURE — 97110 THERAPEUTIC EXERCISES: CPT

## 2022-08-29 PROCEDURE — 97112 NEUROMUSCULAR REEDUCATION: CPT

## 2022-08-29 NOTE — PROGRESS NOTES
PT DAILY TREATMENT NOTE     Patient Name: Loly Padilla  Date:2022  : 1960  [x]  Patient  Verified  Payor: [facility] / Plan: [address] [place] [address] [facility] / Product Type: HMO /    In time: 9:20 Out time: 10:24  Total Treatment Time (min): 64  Visit #: 8 of 10    Treatment Area: Other low back pain [M54.59]    SUBJECTIVE  Pain Level (0-10 scale): 2-3/10 L lumbar spine   Any medication changes, allergies to medications, adverse drug reactions, diagnosis change, or new procedure performed?: [x] No    [] Yes (see summary sheet for update)  Subjective functional status/changes:   [] No changes reported  Pt reports feeling stiff. Pt reports going ice skating for the first time in 10 weeks and it went \"Really well\", \"we did really easy dances that don't require a lot of movement on R foot, I could balance on one foot. I didn't have the power, I don't push off very well to complete my [place]. I had difficulty with switching from inside to outside/inside edge. The glutes are okay today. \"     OBJECTIVE:    34 min Therapeutic Exercise:  [x] See flow sheet :    -Bent over rows done in staggered stance for sport specific movements  -   Rationale: increase ROM and increase strength to improve LE strength/mobility and  lumbar mobility to improve ease of ADLs and gait. 15 min Therapeutic Activity:  [x]  See flow sheet :    -holland carries: incresad to 20# and VC for upright trunk posture and for toe off the hallux instead of hip  and foot ER to compensate for weakness and immobility in the great toe and ankle    Rationale: increase strength, improve coordination, improve balance, and increase proprioception  to improve the patients ability to perform transfers, stair negotiation, functional lifts, and functional carries.   Pt education: dead lift technique, farmer carry technique and purpose     15 min Neuromuscular Re-education:  [x]  See flow sheet :    -squats done on BOSU for improved LE and core control -steamboats performed with 1 LE on airex  -planks: modified, tall planks on BOSU (knees on airex)    Rationale: increase strength, improve coordination, improve balance and increase proprioception  to improve the patients ability to perform functional tasks with improved abdominal brace utilization, improved control, and decreased risk for falls. X min Manual therapy :    -DTM and TrP release to L glute med and proximal lateral glute max fibers in prone   Rationale: to decrease mm pain and improve gait, sport and decrease DOMS       With   [x] TE   [x] TA   [] neuro   [] other: Patient Education: [x] Review HEP    [] Progressed/Changed HEP based on:   [] positioning   [] body mechanics   [] transfers   [] heat/ice application    [] other:      Other Objective/Functional Measures:  Increased reps of holland carries, increased floor to wait lift reps    Pain (0-10 scale) post treatment:     ASSESSMENT/Changes in Function: Pt demonstrates L rearfoot valgus during BOSU squats, is unable to fully  correct with Vcs. Pt demonstrates difficulty with balance during dead rows, R>L. Pt requires Vcs to correct toe out position during many exercises throughout session. Pt has no c/o increased pain during todays increase in reps. Patient will continue to benefit from skilled PT services to modify and progress therapeutic interventions, address functional mobility deficits, address ROM deficits, address strength deficits, analyze and address soft tissue restrictions, analyze and cue movement patterns, analyze and modify body mechanics/ergonomics, assess and modify postural abnormalities and address imbalance/dizziness to attain remaining goals. []  See Plan of Care  []  See progress note/recertification  []  See Discharge Summary         Progress towards goals / Updated goals:  Short Term Goals:  To be accomplished in 1 week  - Goal: Pt to be compliant with initial HEP to improve ability to normalize gait and improve ankle/toe strength and mobility. Status at last note/certification: established and reviewed with patient  Current: goal met; pt notes compliance and difficulty with assuming 1/2 kneeling position (8/10/22)     Long Term Goals: To be accomplished in 10 treatments  - Goal: Pt to maintain forward plank for at least 90 seconds to demo improved core stability. 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 Status at last note/certification: has not initiated lifting yet, not able to lift > 10 lbs per precautions at this time  Current: goal progressing; pt progressed to 17.5# with fatigue over 2x10 reps (8/29/22)     - Goal: Pt to demo at least 50 deg of right great toe AAROM to improve ease with toe off phase of gait. Status at last note/certification: 12 deg  Current: slow progress with 20 deg AROM, PROM 22 deg (8/22/22); addressed with standing hallux extension and gait training (8/26/22)     - Goal: Pt to demo SLS balance of at least 30\" B to improve safety during PLOF exercise classes. Status at last note/certification: right 22\", left 17\"  Current: 17 \" max; with R LE held in 'ice skating' position (8/22/22); addressed with airex interventions, standing hallux extension and toe yoga (8/26/22)     - Goal: Pt to demonstrate 5/5 right hip abduction MMT to increase ease of prolonged standing. Status at last note/certification: 4/5  Current: addressing with stand hip abduction isometrics (8/12/22)     - Goal: Pt to report FOTO score of at least 67 pts to demonstrate improved function and quality of life.   Status at last note/certification: FOTO 60 pts  Current: progressing with successful return to work, pure barre (8/26/22)    PN due 9/3/22    PLAN  [x]  Upgrade activities as tolerated     [x]  Continue plan of care  []  Update interventions per flow sheet       []  Discharge due to:_  [x]  Other:_ PN LEO Lloyd PTA 8/29/2022  7:38 AM    Future Appointments   Date Time Provider Michael Orosco   8/29/2022  9:15 AM [address] [address] [facility]   9/2/2022  9:30 AM Sonia Sue, PTA [facility]   9/12/2022 10:00 AM Anoop Alves [facility]   9/20/2022 10:00 AM Sonia Sue, PTA [facility]   9/23/2022 12:45 PM Sonia Linette, PTA [facility]

## 2022-09-02 ENCOUNTER — HOSPITAL ENCOUNTER (OUTPATIENT)
Dept: PHYSICAL THERAPY | Age: 62
Discharge: HOME | End: 2022-09-02
Payer: COMMERCIAL

## 2022-09-02 PROCEDURE — 97112 NEUROMUSCULAR REEDUCATION: CPT

## 2022-09-02 PROCEDURE — 97140 MANUAL THERAPY 1/> REGIONS: CPT

## 2022-09-02 PROCEDURE — 97110 THERAPEUTIC EXERCISES: CPT

## 2022-09-02 NOTE — PROGRESS NOTES
[facility] AT [address] [facility], [place] [address]  Phone: [phone] Fax: [phone]  PROGRESS NOTE  Patient Name: Tony Gerardo : 1960   Treatment/Medical Diagnosis: Other low back pain [M54.59]   Referral Source: Neel Farrell MD     Date of Initial Visit: 8/3/22 Attended Visits: 9 Missed Visits: 0     SUMMARY OF TREATMENT  Pt is a pleasant 63 y.o. female who presents with c/o low back pain following lumbar microdiscectomy on 22. Treatment has consisted of the following: Therapeutic exercise, Therapeutic activities, Neuromuscular re-education, Physical agent/modality, Gait/balance training, Manual therapy, Patient education, Self Care training, Functional mobility training, Home safety training, and Stair training. CURRENT STATUS  Patient is currently 10 weeks, 2 days post-operatively and has returned to work part-time with minimal issues at this time. Patient reports pain has improved, averaging 0-3/10 in the low back and right hip, mainly aggravated by long-duration sitting and alleviated with \"moving around\", HEP. Patient has reinitiated ice skating under supervision of a  as she reports post-operative precautions were D/C by referring surgeon (with the exception of \"avoiding deadlifts\"). C/c regarding weakness in the right hip affecting return to recreation (ice skating, barre, PLOF).  Assessment as follows:    Improvements: increased flexibility, some improvements in balance, return to work part-time without restriction sa s per MD, lifting equal to or less than 25 pounds from floor,   Deficits: continued paresthesia in the right L4-5 dermatome,   Patient's goal: \"less stiffness in the hip abductors and hip flexors; to get back to same level before the injury\"  FOTO score: 77/100 (at last assessment, 60/10)  Hip AROM: flex (R) 120 deg (L) 117 deg, abd (R) 23 deg (L) 37 deg, ext (R) 10 deg (L) 5 deg, ER (R) 27 deg (L) 45 deg, IR (R) 35 (L) 44 deg  Ankle DF AROM, in short-sitting: (R) 11 deg, (L) 18 deg  Great Toe extension AROM, measured with the knee extended: Right 23 deg, left 65 deg        Hip strength: glute med 4-/5 bilaterally, glute max (R) 4-/5 (L) 5/5, ER/IR 5/5 bilaterally  SLS: (R) 14 sec, (L) 28 sec     Plank: 90 seconds  Pain: (A) 2-3, (B) 0, (W) 3    Goal/Measure of Progress   - Goal: Pt to maintain forward plank for at least 90 seconds to demo improved core stability. 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 Status at last note/certification: has not initiated lifting yet, not able to lift > 10 lbs per precautions at this time  Current: goal met; pt progressed to 25# with fatigue for 5 reps      - Goal: Pt to demo at least 50 deg of right great toe AAROM to improve ease with toe off phase of gait. Status at last note/certification: 12 deg  Current: goal progressing; slow progress, but with some improvement to 23 deg active range of motion     - Goal: Pt to demo SLS balance of at least 30\" B to improve safety during PLOF exercise classes. Status at last note/certification: right 22\", left 17\"  Current: goal progressing; right 14 seconds, left 28 seconds in high march SLS     - Goal: Pt to demonstrate 5/5 right hip abduction MMT to increase ease of prolonged standing. Status at last note/certification: 4/5  Current: goal progressing; 4-/5 glute med strength     - Goal: Pt to report FOTO score of at least 67 pts to demonstrate improved function and quality of life. Status at last note/certification: FOTO 60 pts  Current: goal met;  FOTO 77 pts    New Goals to be achieved in __4-8__  treatments:  1. Pt to demo at least 50 deg of right great toe AAROM to improve ease with toe off phase of gait.    2.  Pt to demo SLS balance of at least 30\" B to improve safety during PLOF exercise classes. 3.  Pt to demonstrate 5/5 right hip abduction MMT to increase ease of prolonged standing. 4.  Pt will increase right hip ER AROM to >/= 34 degrees to improve ease with squatting. RECOMMENDATIONS  Recommend cont skilled PT at 1-2x/weekly for 4 weeks to address strength/mobility deficits, achieve stated goals, and progress patient towards PLOF. If you have any questions/comments please contact us directly at [phone]. Thank you for allowing us to assist in the care of your patient. LPTA Signature: Rose Miller  Date: 9/2/2022   PT Signature: Nara Zuniga DPT Time: 8:32am    NOTE TO PHYSICIAN:  PLEASE COMPLETE THE ORDERS BELOW AND FAX TO   [facility]: [phone]. If you are unable to process this request in 24 hours please contact our office: [phone].  ___ I have read the above report and request that my patient continue as recommended.   ___ I have read the above report and request that my patient continue therapy with the following changes/special instructions:_________________________________________________________   ___ I have read the above report and request that my patient be discharged from therapy.      Physician Signature:        Date:       Time:

## 2022-09-02 NOTE — PROGRESS NOTES
PT DAILY TREATMENT NOTE     Patient Name: Tierney Barriga  Date:2022  : 1960  [x]  Patient  Verified  Payor: Joe Garcia / Plan: [employer] HMO / Product Type: HMO /    In time: 9:30 am              Out time: 10:34 am  Total Treatment Time (min): 64  Visit #: 9 of 10    Treatment Area: Other low back pain [M54.59]    SUBJECTIVE  Pain Level (0-10 scale): 3 in lumbar spine  Any medication changes, allergies to medications, adverse drug reactions, diagnosis change, or new procedure performed?: [x] No    [] Yes (see summary sheet for update)  Subjective functional status/changes:   [] No changes reported  Patient reports continued difficulty with stability when balancing on the right leg when completing/achieving circles while ice skating. OBJECTIVE:    36 min Therapeutic Exercise:  [x] See flow sheet: assisted patient with FOTO completion; reassessment   Rationale: increase ROM and increase strength to improve LE strength/mobility and  lumbar mobility to improve ease of ADLs and gait. 3 min Therapeutic Activity:  [x]  See flow sheet: assessed crate lift at 25# - no increased pain reported, soreness   Rationale: increase strength, improve coordination, improve balance, and increase proprioception  to improve the patients ability to perform transfers, stair negotiation, functional lifts, and functional carries.   Pt education: dead lift technique, farmer carry technique and purpose     13 min Neuromuscular Re-education:  [x]  See flow sheet:    BOSU step up knee drives with mirror assistance    RDLs with YTB for increased hip ER/ABD on the stance leg, attached to vertical beam of the parallel bars; definite use of UEs to improve form and decrease spinal strain    Rationale: increase strength, improve coordination, improve balance and increase proprioception  to improve the patients ability to perform functional tasks with improved abdominal brace utilization, improved control, and decreased risk for falls. 12 min Manual therapy: (R) piriformis ART f/b prone quadriceps stretch and gentle PA tibial mobs throughout stretch   Rationale: to decrease mm pain and improve gait, sport and decrease DOMS       With   [x] TE   [x] TA   [] neuro   [] other: Patient Education: [x] Review HEP    [] Progressed/Changed HEP based on:   [] positioning   [] body mechanics   [] transfers   [] heat/ice application    [] other:      Other Objective/Functional Measures:  Improvements: increased flexibility, some improvements in balance, return to work part-time without restriction sa s per MD, lifting equal to or less than 25 pounds from floor,   Deficits: continued paresthesia in the right L4-5 dermatome,   Patient's goal: \"less stiffness in the hip abductors and hip flexors; to get back to same level before the injury\"  FOTO score: 77/100 (at last assessment, 60/10)  Hip AROM: flex (R) 120 deg (L) 117 deg, abd (R) 23 deg (L) 37 deg, ext (R) 10 deg (L) 5 deg, ER (R) 27 deg (L) 45 deg, IR (R) 35 (L) 44 deg  Ankle DF AROM, in short-sitting: (R) 11 deg, (L) 18 deg  Great Toe extension AROM, measured with the knee extended: Right 23 deg, left 65 deg        Hip strength: glute med 4-/5 bilaterally, glute max (R) 4-/5 (L) 5/5, ER/IR 5/5 bilaterally  SLS: (R) 14 sec, (L) 28 sec    Plank: 90 seconds  Pain: (A) 2-3, (B) 0, (W) 3      Pain (0-10 scale) post treatment:     ASSESSMENT/Changes in Function:   See PN. Patient will continue to benefit from skilled PT services to modify and progress therapeutic interventions, address functional mobility deficits, address ROM deficits, address strength deficits, analyze and address soft tissue restrictions, analyze and cue movement patterns, analyze and modify body mechanics/ergonomics, assess and modify postural abnormalities and address imbalance/dizziness to attain remaining goals.      []  See Plan of Care  [x]  See progress note/recertification (1/5/23)  []  See Discharge Summary Progress towards goals / Updated goals:  - Goal: Pt to maintain forward plank for at least 90 seconds to demo improved core stability. 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 Status at last note/certification: has not initiated lifting yet, not able to lift > 10 lbs per precautions at this time  Current: goal met; pt progressed to 25# with fatigue for 5 reps      - Goal: Pt to demo at least 50 deg of right great toe AAROM to improve ease with toe off phase of gait. Status at last note/certification: 12 deg  Current: goal progressing; slow progress, but with some improvement to 23 deg active range of motion     - Goal: Pt to demo SLS balance of at least 30\" B to improve safety during PLOF exercise classes. Status at last note/certification: right 22\", left 17\"  Current: goal progressing; right 14 seconds, left 28 seconds in high march SLS     - Goal: Pt to demonstrate 5/5 right hip abduction MMT to increase ease of prolonged standing. Status at last note/certification: 4/5  Current: goal progressing; 4-/5 glute med strength     - Goal: Pt to report FOTO score of at least 67 pts to demonstrate improved function and quality of life.   Status at last note/certification: FOTO 60 pts  Current: goal met;  FOTO 77 pts    PN due 9/3/22    PLAN  [x]  Upgrade activities as tolerated     [x]  Continue plan of care  []  Update interventions per flow sheet       []  Discharge due to:_  [x]  Other: see PN; cont 1-2x/4    Zenobia John PTA 9/2/2022    Future Appointments   Date Time Provider Michael Orosco   9/12/2022  9:15 AM Srinivas Porter [facility]   9/20/2022 10:00 AM Tal Simon PTA [facility]   9/23/2022 12:45 PM Tal Simon PTA [facility]

## 2022-09-12 ENCOUNTER — HOSPITAL ENCOUNTER (OUTPATIENT)
Dept: PHYSICAL THERAPY | Age: 62
Discharge: HOME | End: 2022-09-12
Payer: COMMERCIAL

## 2022-09-12 PROCEDURE — 97530 THERAPEUTIC ACTIVITIES: CPT

## 2022-09-12 PROCEDURE — 97112 NEUROMUSCULAR REEDUCATION: CPT

## 2022-09-12 PROCEDURE — 97110 THERAPEUTIC EXERCISES: CPT

## 2022-09-12 NOTE — PROGRESS NOTES
PT DAILY TREATMENT NOTE     Patient Name: Christa Perla  Date:2022  : 1960  [x]  Patient  Verified  Payor: Maury Delgado / Plan: [employer] / Product Type: HMO /    In time:9:15  Out time:10:14  Total Treatment Time (min): 59  Visit #: 1 of 8    Medicare/BCBS Only   Total Timed Codes (min):   1:1 Treatment Time:         Treatment Area: Other low back pain [M54.59]    SUBJECTIVE  Pain Level (0-10 scale): 3  Any medication changes, allergies to medications, adverse drug reactions, diagnosis change, or new procedure performed?: [x] No    [] Yes (see summary sheet for update)  Subjective functional status/changes:   [] No changes reported  \"I had to sit on the tarmac in an airplane yesterday for a few hours so I am definitely feeling that\"    OBJECTIVE    10 min Therapeutic Exercise:  [x] See flow sheet :   Rationale: increase ROM and increase strength to improve LE strength/mobility and  lumbar mobility to improve ease of ADLs and gait. 15 min Therapeutic Activity:  [x]  See flow sheet :   Rationale: increase strength, improve coordination, improve balance, and increase proprioception  to improve the patients ability to perform transfers, stair negotiation, functional lifts, and functional carries. Pt education: squatting technique, march farmer's walks     34 min Neuromuscular Re-education:  [x]  See flow sheet :   Rationale: increase strength, improve coordination, improve balance and increase proprioception  to improve the patients ability to perform functional tasks with improved abdominal brace utilization, improved control, and decreased risk for falls.       With   [] TE   [] TA   [] neuro   [] other: Patient Education: [x] Review HEP    [] Progressed/Changed HEP based on:   [] positioning   [] body mechanics   [] transfers   [] heat/ice application    [] other:      Other Objective/Functional Measures: -Added wide base of support lateral lunge <>single leg stance to facilitate improved lateral weight acceptance     Pain Level (0-10 scale) post treatment: 0    ASSESSMENT/Changes in Function: Patient demos improved single leg stance on right LE when initiating from narrow base of support but demonstrates significantly impaired ability to accurately shift weight laterally/forward through larger excursion and then establish single leg stance. This is likely due to combination of impaired sensation in right LE and impaired right LE strength. Patient will continue to benefit from skilled PT services to modify and progress therapeutic interventions, address functional mobility deficits, address ROM deficits, address strength deficits, analyze and address soft tissue restrictions, analyze and cue movement patterns, analyze and modify body mechanics/ergonomics, assess and modify postural abnormalities and address imbalance/dizziness to attain remaining goals. []  See Plan of Care  []  See progress note/recertification  []  See Discharge Summary         Progress towards goals / Updated goals:  1. Pt to demo at least 50 deg of right great toe AAROM to improve ease with toe off phase of gait. Current:   2. Pt to demo SLS balance of at least 30\" B to improve safety during PLOF exercise classes. Current: progressing, right SLS 25\" (9/12/22)  3. Pt to demonstrate 5/5 right hip abduction MMT to increase ease of prolonged standing. 4.  Pt will increase right hip ER AROM to >/= 34 degrees to improve ease with squatting.        PLAN  [x]  Upgrade activities as tolerated     [x]  Continue plan of care  []  Update interventions per flow sheet       []  Discharge due to:_  []  Other:_      Beti Antonio 9/12/2022  9:08 AM    Future Appointments   Date Time Provider Michael Orosco   9/12/2022  9:15 AM Rohit Harper [facility]   9/20/2022 10:00 [facility]   9/23/2022 12:45 PM [facility]   9/30/2022  9:30 AM Devante Wells PTA [facility]   10/3/2022  9:15 AM Sinan Pro [facility]   10/17/2022  9:15 AM Sinan Pro [facility]   10/24/2022  9:15 AM Sinan Pro [facility]   10/31/2022  9:15 AM Sinan Pro [facility]

## 2022-09-20 ENCOUNTER — HOSPITAL ENCOUNTER (OUTPATIENT)
Dept: PHYSICAL THERAPY | Age: 62
Discharge: HOME | End: 2022-09-20
Payer: COMMERCIAL

## 2022-09-20 PROCEDURE — 97110 THERAPEUTIC EXERCISES: CPT

## 2022-09-20 PROCEDURE — 97530 THERAPEUTIC ACTIVITIES: CPT

## 2022-09-20 PROCEDURE — 97112 NEUROMUSCULAR REEDUCATION: CPT

## 2022-09-20 NOTE — PROGRESS NOTES
PT DAILY TREATMENT NOTE     Patient Name: Madie Bird  Date:2022  : 1960  [x]  Patient  Verified  Payor: Guru Pedraza / Plan: [employer] [place] [employer] / Product Type: HMO /    In time: 10:01 am               Out time: 10:48 am  Total Treatment Time (min): 47  Visit #: 2 of 8    Treatment Area: Other low back pain [M54.59]    SUBJECTIVE  Pain Level (0-10 scale): 4  Any medication changes, allergies to medications, adverse drug reactions, diagnosis change, or new procedure performed?: [x] No    [] Yes (see summary sheet for update)  Subjective functional status/changes:   [] No changes reported  Patient notes ability to drive 9 hours to return home. OBJECTIVE  12 min Therapeutic Exercise:  [x] See flow sheet:    Rationale: increase ROM and increase strength to improve LE strength/mobility and  lumbar mobility to improve ease of ADLs and gait. 16 min Therapeutic Activity:  [x]  See flow sheet:    Rationale: increase strength, improve coordination, improve balance, and increase proprioception  to improve the patients ability to perform transfers, stair negotiation, functional lifts, and functional carries. Pt education: squatting technique, march farmer's walks     19 min Neuromuscular Re-education:  [x]  See flow sheet:    Rationale: increase strength, improve coordination, improve balance and increase proprioception  to improve the patients ability to perform functional tasks with improved abdominal brace utilization, improved control, and decreased risk for falls.     With   [x] TE   [x] TA   [x] neuro   [] other: Patient Education: [x] Review HEP    [] Progressed/Changed HEP based on:   [] positioning   [] body mechanics   [] transfers   [] heat/ice application    [] other:      Other Objective/Functional Measures:   ER AROM: (R) 23 deg, (L) 44 deg    Pain Level (0-10 scale) post treatment: 0    ASSESSMENT/Changes in Function:   Patient challenged with all [facility] hip ER PREs, requiring cueing for cognizant PPT and abdominal draw. Patient will continue to benefit from skilled PT services to modify and progress therapeutic interventions, address functional mobility deficits, address ROM deficits, address strength deficits, analyze and address soft tissue restrictions, analyze and cue movement patterns, analyze and modify body mechanics/ergonomics, assess and modify postural abnormalities and address imbalance/dizziness to attain remaining goals. []  See Plan of Care  []  See progress note/recertification  []  See Discharge Summary         Progress towards goals / Updated goals:  1. Pt to demo at least 50 deg of right great toe AAROM to improve ease with toe off phase of gait. Current:   2. Pt to demo SLS balance of at least 30\" B to improve safety during PLOF exercise classes. Current: progressing, right SLS 25\" (9/12/22)  3. Pt to demonstrate 5/5 right hip abduction MMT to increase ease of prolonged standing.   4.  Pt will increase right hip ER AROM to >/= 34 degrees to improve ease with squatting. -Goal progressing; 23 deg right hip ER AROM in sitting (9/20/22)       PLAN  [x]  Upgrade activities as tolerated     [x]  Continue plan of care  []  Update interventions per flow sheet       []  Discharge due to:_  []  Other:_      Chelsea Almonte PTA 9/20/2022    Future Appointments   Date Time Provider Michael Orosco   9/20/2022 10:00 AM Merari Jacob PTA [facility]   9/23/2022 12:45 PM Merari Jacob PTA [facility]   9/30/2022  9:30 AM Merari Jacob PTA [facility]   10/3/2022  9:15 AM Pansy Tien [facility]   10/17/2022  9:15 AM Pansy Tien [facility]   10/24/2022  9:15 AM Pansy Tien [facility]   10/31/2022  9:15 AM Pansy Tien [facility]

## 2022-09-22 ENCOUNTER — TELEPHONE (OUTPATIENT)
Dept: PHYSICAL THERAPY | Age: 62
End: 2022-09-22

## 2022-09-23 ENCOUNTER — HOSPITAL ENCOUNTER (OUTPATIENT)
Dept: PHYSICAL THERAPY | Age: 62
Discharge: HOME | End: 2022-09-23
Payer: COMMERCIAL

## 2022-09-23 PROCEDURE — 97530 THERAPEUTIC ACTIVITIES: CPT

## 2022-09-23 PROCEDURE — 97112 NEUROMUSCULAR REEDUCATION: CPT

## 2022-09-23 PROCEDURE — 97110 THERAPEUTIC EXERCISES: CPT

## 2022-09-23 NOTE — PROGRESS NOTES
PT DAILY TREATMENT NOTE     Patient Name: Dorothea Mckeon  Date:2022  : 1960  [x]  Patient  Verified  Payor: Chuck Orellana / Plan: Ricardo Barriga HMO / Product Type: HMO /    In time:8:46  Out time:7:32  Total Treatment Time (min): 46  Visit #: 3 of 8    Medicare/BCBS Only   Total Timed Codes (min):   1:1 Treatment Time:         Treatment Area: Other low back pain [M54.59]    SUBJECTIVE  Pain Level (0-10 scale): 4-5  Any medication changes, allergies to medications, adverse drug reactions, diagnosis change, or new procedure performed?: [x] No    [] Yes (see summary sheet for update)  Subjective functional status/changes:   [] No changes reported  \"I had to hold a child for about 30 minutes and now my back is giving me some more difficulty. \"    OBJECTIVE    15 min Therapeutic Exercise:  [x] See flow sheet :   Rationale: increase ROM and increase strength to improve LE strength/mobility and  lumbar mobility to improve ease of ADLs and gait. 13 min Therapeutic Activity:  [x]  See flow sheet :   Rationale: increase strength, improve coordination, improve balance, and increase proprioception  to improve the patients ability to perform transfers, stair negotiation, functional lifts, and functional carries. Pt education: squat technique     18 min Neuromuscular Re-education:  [x]  See flow sheet :   Rationale: increase strength, improve coordination, improve balance and increase proprioception  to improve the patients ability to perform functional tasks with improved abdominal brace utilization, improved control, and decreased risk for falls.           With   [] TE   [] TA   [] neuro   [] other: Patient Education: [x] Review HEP    [] Progressed/Changed HEP based on:   [] positioning   [] body mechanics   [] transfers   [] heat/ice application    [] other:      Other Objective/Functional Measures: -Added lat step downs     Pain Level (0-10 scale) post treatment: 3-4    ASSESSMENT/Changes in Function: Patient requires continued skilled verba/visual cues for correct technique with lateral step downs to avoid genu valgus and promote controlled eccentric extension strengthening. She demonstrates improved dynamic single leg stance during star balance taps although her right LE continues to be less successful maintaining balance and able to tolerate smaller excursions in comparison to left. Patient will continue to benefit from skilled PT services to modify and progress therapeutic interventions, address functional mobility deficits, address ROM deficits, address strength deficits, analyze and address soft tissue restrictions, analyze and cue movement patterns, analyze and modify body mechanics/ergonomics, assess and modify postural abnormalities and address imbalance/dizziness to attain remaining goals. []  See Plan of Care  []  See progress note/recertification  []  See Discharge Summary         Progress towards goals / Updated goals:  1. Pt to demo at least 50 deg of right great toe AAROM to improve ease with toe off phase of gait. Current:   2. Pt to demo SLS balance of at least 30\" B to improve safety during PLOF exercise classes. Current: progressing, right SLS 25\" (9/23/22)  3. Pt to demonstrate 5/5 right hip abduction MMT to increase ease of prolonged standing.   4.  Pt will increase right hip ER AROM to >/= 34 degrees to improve ease with squatting. -Goal progressing; 23 deg right hip ER AROM in sitting (9/20/22)    PLAN  [x]  Upgrade activities as tolerated     [x]  Continue plan of care  []  Update interventions per flow sheet       []  Discharge due to:_  []  Other:_      Diane Cisneros 9/23/2022  7:05 AM    Future Appointments   Date Time Provider Michael Orosco   9/23/2022  8:45 AM Gorge Libel [facility]   9/30/2022  9:30 AM Navya Mcgraw, PTA [facility]   10/3/2022  9:15 AM Gorge Libel [facility]   10/17/2022  9:15 AM Gorge Libel [facility]   10/24/2022  9:15 AM Rosy Hernandez [facility]   10/31/2022  9:15 AM [facility]

## 2022-09-30 ENCOUNTER — HOSPITAL ENCOUNTER (OUTPATIENT)
Dept: PHYSICAL THERAPY | Age: 62
Discharge: HOME | End: 2022-09-30
Payer: COMMERCIAL

## 2022-09-30 PROCEDURE — 97112 NEUROMUSCULAR REEDUCATION: CPT

## 2022-09-30 PROCEDURE — 97140 MANUAL THERAPY 1/> REGIONS: CPT

## 2022-09-30 PROCEDURE — 97530 THERAPEUTIC ACTIVITIES: CPT

## 2022-09-30 PROCEDURE — 97110 THERAPEUTIC EXERCISES: CPT

## 2022-09-30 NOTE — PROGRESS NOTES
PT DAILY TREATMENT NOTE     Patient Name: Vin Arriola  Date:2022  : 1960  [x]  Patient  Verified  Payor: Dion Jocelynn / Plan: [address] [place] [employer] / Product Type: HMO /    In time: 9:30 am             Out time: 10:21 am  Total Treatment Time (min): 51  Visit #: 4 of 8    Medicare/BCBS Only   Total Timed Codes (min):   1:1 Treatment Time:         Treatment Area: Other low back pain [M54.59]    SUBJECTIVE  Pain Level (0-10 scale): 2-3  Any medication changes, allergies to medications, adverse drug reactions, diagnosis change, or new procedure performed?: [x] No    [] Yes (see summary sheet for update)  Subjective functional status/changes:   [] No changes reported  \"I did a little jumping with Jeannie Lights ( PT) and he said I was doing more toe to heel. \"    OBJECTIVE    12 min Therapeutic Exercise:  [x] See flow sheet :   Rationale: increase ROM and increase strength to improve LE strength/mobility and  lumbar mobility to improve ease of ADLs and gait. 14 min Therapeutic Activity:  [x]  See flow sheet:    Rationale: increase strength, improve coordination, improve balance, and increase proprioception  to improve the patients ability to perform transfers, stair negotiation, functional lifts, and functional carries. Pt education: squat technique     17 min Neuromuscular Re-education:  [x]  See flow sheet: added glute prep (standing hip IR/ER   Rationale: increase strength, improve coordination, improve balance and increase proprioception  to improve the patients ability to perform functional tasks with improved abdominal brace utilization, improved control, and decreased risk for falls. 8 min Manual Therapy:  massage gun STM to (B) glute max f/b (R) piriformis ART   Rationale: decrease pain, increase ROM, and increase tissue extensibility to improve the patients ability to perform ADLs.  The manual therapy interventions were performed at a separate and distinct time from the therapeutic activities interventions. With   [] TE   [] TA   [] neuro   [] other: Patient Education: [x] Review HEP    [] Progressed/Changed HEP based on:   [] positioning   [] body mechanics   [] transfers   [] heat/ice application    [] other:      Other Objective/Functional Measures:     Pain Level (0-10 scale) post treatment: 0    ASSESSMENT/Changes in Function:   Notable difficulty avoiding genu valgus positioning with all [facility] interventions onto the (R) LE. Patient will continue to benefit from skilled PT services to modify and progress therapeutic interventions, address functional mobility deficits, address ROM deficits, address strength deficits, analyze and address soft tissue restrictions, analyze and cue movement patterns, analyze and modify body mechanics/ergonomics, assess and modify postural abnormalities and address imbalance/dizziness to attain remaining goals. []  See Plan of Care  []  See progress note/recertification  []  See Discharge Summary         Progress towards goals / Updated goals:  1. Pt to demo at least 50 deg of right great toe AAROM to improve ease with toe off phase of gait. Current:   2. Pt to demo SLS balance of at least 30\" B to improve safety during PLOF exercise classes. Current: progressing, right SLS 25\" (9/23/22)  3. Pt to demonstrate 5/5 right hip abduction MMT to increase ease of prolonged standing.   4.  Pt will increase right hip ER AROM to >/= 34 degrees to improve ease with squatting. -Goal progressing; 23 deg right hip ER AROM in sitting (9/20/22)    PLAN  [x]  Upgrade activities as tolerated     [x]  Continue plan of care  []  Update interventions per flow sheet       []  Discharge due to:_  []  Other:_      Donell Campos, MAX 9/30/2022     Future Appointments   Date Time Provider Michael Orosco   10/3/2022  9:15 AM Gaby [facility]   10/17/2022  9:15 AM [facility]   10/24/2022  9:15 AM [facility] 10/31/2022  9:15 AM Kaden Ibarra [facility]

## 2022-10-03 ENCOUNTER — HOSPITAL ENCOUNTER (OUTPATIENT)
Dept: PHYSICAL THERAPY | Age: 62
Discharge: HOME | End: 2022-10-03
Payer: COMMERCIAL

## 2022-10-03 PROCEDURE — 97112 NEUROMUSCULAR REEDUCATION: CPT

## 2022-10-03 PROCEDURE — 97110 THERAPEUTIC EXERCISES: CPT

## 2022-10-03 PROCEDURE — 97530 THERAPEUTIC ACTIVITIES: CPT

## 2022-10-03 NOTE — PROGRESS NOTES
PT DAILY TREATMENT NOTE     Patient Name: Reena Estrada  Date:10/3/2022  : 1960  [x]  Patient  Verified  Payor: Dalton Fuentes / Plan: [employer] [place] [employer] / Product Type: HMO /    In time:9:19  Out time:10:20  Total Treatment Time (min): 61  Visit #: 5 of 8    Medicare/BCBS Only   Total Timed Codes (min):  61 1:1 Treatment Time:  61       Treatment Area: Other low back pain [M54.59]    SUBJECTIVE  Pain Level (0-10 scale): 2-3  Any medication changes, allergies to medications, adverse drug reactions, diagnosis change, or new procedure performed?: [x] No    [] Yes (see summary sheet for update)  Subjective functional status/changes:   [] No changes reported  \"I am feeling a bit sore today but not too bad. I was able to skate on Friday and noticed I have much more push off on my right foot when I am skating. I still have difficulty lifting stuff out of my top loading washer\"    OBJECTIVE    13 min Therapeutic Exercise:  [x] See flow sheet :   Rationale: increase ROM and increase strength to improve LE strength/mobility and  lumbar mobility to improve ease of ADLs and gait. 16 min Therapeutic Activity:  [x]  See flow sheet :   Rationale: increase strength, improve coordination, improve balance, and increase proprioception  to improve the patients ability to perform transfers, stair negotiation, functional lifts, and functional carries. Pt education: progress toward goals, nerve recovery     32 min Neuromuscular Re-education:  [x]  See flow sheet :   Rationale: increase strength, improve coordination, improve balance and increase proprioception  to improve the patients ability to perform functional tasks with improved abdominal brace utilization, improved control, and decreased risk for falls.          With   [x] TE   [] TA   [x] neuro   [] other: Patient Education: [x] Review HEP    [] Progressed/Changed HEP based on:   [] positioning   [] body mechanics   [] transfers   [] heat/ice application    [] other: Other Objective/Functional Measures:   LE Strength:   Right (/5) Left (/5)   Hip     Flexion 5 5             Abduction 4+ 5             Adduction               Extension 4 5             ER 4+ 5             IR 4+ 5   Knee   Extension 5 5              Flexion 5 5   Right Ankle: 4+/5 grossly  Trunk Flexor Endurance: 90\" forward plank  Lateral plank right 37\". Left 24\"    Gait:   When running: trunk to left, right hip popping out to right  Right LE more externally rotated, toes out  Functional Squat: able to perform squatting, dead lifts without increased pain  Stair Negotiation: reciprocal without handrails  Balance: SLS right 25\", left 30\"       Pain Level (0-10 scale) post treatment: 1    ASSESSMENT/Changes in Function:   Patient has attended PT for 14 sessions for the treatment of low back pain and right LE pain/weakness. The patient demonstrates continued steady, slow progress at this time. She notes continued improvement in right ankle strength/endurance during prolonged ambulation, exercise classes, and ice dancing sessions, permitting improved activity tolerance. She also demonstrates improved right foot stability/strength which has improved her ease with single leg stance balance during ice dancing and obstacle negotiation, though balance remains challenging due to continued toe numbness. Her higher level activities continue to be somewhat limited by decreased LE endurance at this time, such as running.  Additional assessment includes:  Subjective Gains: improved strength/endurance compared to initial evaluation, decreased pain/stiffness  Subjective Deficits: impaired proprioception on right foot, impaired sensation, some foot drop  Pain   Average: 2-3/10       Best: 0/10     Worst: 5/10  Patient Goal: \"continue working on proprioception and right LE strength\"      Patient will continue to benefit from skilled PT services to modify and progress therapeutic interventions, address functional mobility deficits, address ROM deficits, address strength deficits, analyze and address soft tissue restrictions, analyze and cue movement patterns, analyze and modify body mechanics/ergonomics, assess and modify postural abnormalities and address imbalance/dizziness to attain remaining goals. []  See Plan of Care  [x]  See progress note/recertification  []  See Discharge Summary         Progress towards goals / Updated goals:  1. Pt to demo at least 50 deg of right great toe AAROM to improve ease with toe off phase of gait. Current: progressing, 36 deg (10/3/22)  2. Pt to demo SLS balance of at least 30\" B to improve safety during PLOF exercise classes. Current: progressing, right SLS 25\" (9/23/22)  3. Pt to demonstrate 5/5 right hip abduction MMT to increase ease of prolonged standing. Current: progressing, 4+/5 (10/3/22)  4. Pt will increase right hip ER AROM to >/= 34 degrees to improve ease with squatting.    Current: Goal progressing; 23 deg right hip ER AROM in sitting (9/20/22)    PLAN  [x]  Upgrade activities as tolerated     [x]  Continue plan of care  []  Update interventions per flow sheet       []  Discharge due to:_  []  Other:_      Pradeep Patiño 10/3/2022  9:04 AM    Future Appointments   Date Time Provider Michael Orosco   10/3/2022  9:15 AM Elray Heal [facility]   10/17/2022  9:15 AM Elray Heal [facility]   10/24/2022  9:15 AM Elray Heal [facility]   10/31/2022  9:15 AM Elray Heal [facility]

## 2022-10-03 NOTE — PROGRESS NOTES
[facility] AT [address]  Phone: [phone] Fax: [phone]  PROGRESS NOTE  Patient Name: Reena Estrada : 1960   Treatment/Medical Diagnosis: Other low back pain [M54.59]   Referral Source: Cortez Mas MD     Date of Initial Visit: 8/3/22 Attended Visits: 14 Missed Visits: 0     SUMMARY OF TREATMENT  Pt is a pleasant 63 y.o. female who presents with c/o low back pain following lumbar microdiscectomy on 22. Treatment has consisted of the following: Therapeutic exercise, Therapeutic activities, Neuromuscular re-education, Physical agent/modality, Gait/balance training, Manual therapy, Patient education, Self Care training, Functional mobility training, Home safety training, and Stair training. CURRENT STATUS  Patient has attended PT for 14 sessions for the treatment of low back pain and right LE pain/weakness. The patient demonstrates continued steady, slow progress at this time. She notes continued improvement in right ankle strength/endurance during prolonged ambulation, exercise classes, and ice dancing sessions, permitting improved activity tolerance. She also demonstrates improved right foot stability/strength which has improved her ease with single leg stance balance during ice dancing and obstacle negotiation, though balance remains challenging due to continued toe numbness. Her higher level activities continue to be somewhat limited by decreased LE endurance at this time, such as running.  Additional assessment includes:  Subjective Gains: improved strength/endurance compared to initial evaluation, decreased pain/stiffness  Subjective Deficits: impaired proprioception on right foot, impaired sensation, some foot drop  Pain   Average: 2-3/10                     Best: 0/10                   Worst: 5/10  Patient Goal: \"continue working on proprioception and right LE strength\"  Objective Measures:   LE Strength:    Right (/5) Left (/5)   Hip     Flexion 5 5             Abduction 4+ 5             Adduction                 Extension 4 5             ER 4+ 5             IR 4+ 5   Knee   Extension 5 5              Flexion 5 5   Right Ankle: 4+/5 grossly  Trunk Flexor Endurance: 90\" forward plank  Lateral plank right 37\". Left 24\"     Gait:   When running: trunk to left, right hip popping out to right  Right LE more externally rotated, toes out  Functional Squat: able to perform squatting, dead lifts without increased pain  Stair Negotiation: reciprocal without handrails  Balance: SLS right 25\", left 30\"    Current Goals:  1. Pt to demo at least 50 deg of right great toe AAROM to improve ease with toe off phase of gait. Current: progressing, 36 deg (10/3/22)  2. Pt to demo SLS balance of at least 30\" B to improve safety during PLOF exercise classes. Current: progressing, right SLS 25\" (9/23/22)  3. Pt to demonstrate 5/5 right hip abduction MMT to increase ease of prolonged standing. Current: progressing, 4+/5 (10/3/22)  4. Pt will increase right hip ER AROM to >/= 34 degrees to improve ease with squatting. Current: Goal progressing; 23 deg right hip ER AROM in sitting (9/20/22)        New Goals to be achieved in __4__  treatments:  1. Pt to demo at least 50 deg of right great toe AAROM to improve ease with toe off phase of gait. PN: 36 deg   2. Pt to demo SLS balance of at least 30\" B to improve safety during PLOF exercise classes. PN: right SLS 25\"   3. Pt to demonstrate 5/5 right hip abduction MMT to increase ease of prolonged standing. PN: 4+/5   4. Pt will increase right hip ER AROM to >/= 34 degrees to improve ease with squatting. PN: 23 deg right hip ER AROM in sitting     RECOMMENDATIONS  Pt to continue with skilled therapy for 1x/week for 4 sessions to improve hip strength/endurance and single leg balance to improve ease with ice dancing and exercise class hobbies.     If you have any questions/comments please contact us directly at [phone]   Thank you for allowing us to assist in the care of your patient. Therapist Signature: Jori Salgado Date: 10/3/2022   Reporting Period  9/3/22-10/3/22 Time: 10:35 AM   NOTE TO PHYSICIAN:  PLEASE COMPLETE THE ORDERS BELOW AND FAX TO   [facility]: [phone]. If you are unable to process this request in 24 hours please contact our office: [phone].  ___ I have read the above report and request that my patient continue as recommended.   ___ I have read the above report and request that my patient continue therapy with the following changes/special instructions:_________________________________________________________   ___ I have read the above report and request that my patient be discharged from therapy.      Physician Signature:        Date:       Time:                                                        Jana Johnson MD.

## 2022-10-17 ENCOUNTER — HOSPITAL ENCOUNTER (OUTPATIENT)
Dept: PHYSICAL THERAPY | Age: 62
Discharge: HOME | End: 2022-10-17
Payer: COMMERCIAL

## 2022-10-17 PROCEDURE — 97116 GAIT TRAINING THERAPY: CPT

## 2022-10-17 PROCEDURE — 97112 NEUROMUSCULAR REEDUCATION: CPT

## 2022-10-17 PROCEDURE — 97530 THERAPEUTIC ACTIVITIES: CPT

## 2022-10-17 NOTE — PROGRESS NOTES
PT DAILY TREATMENT NOTE     Patient Name: Brenda Batch  Date:10/17/2022  : 1960  [x]  Patient  Verified  Payor: Diomedes Can / Plan: [employer] [place] [place] HMO / Product Type: HMO /    In time:9:15  Out time:10:08  Total Treatment Time (min): 53  Visit #: 1 of 4    Medicare/BCBS Only   Total Timed Codes (min):   1:1 Treatment Time:         Treatment Area: Other low back pain [M54.59]    SUBJECTIVE  Pain Level (0-10 scale): 2-3  Any medication changes, allergies to medications, adverse drug reactions, diagnosis change, or new procedure performed?: [x] No    [] Yes (see summary sheet for update)  Subjective functional status/changes:   [] No changes reported  \"I am a little sore today, I just flew back from [place], I was visiting my parents\"    OBJECTIVE    11 min Therapeutic Activity:  [x]  See flow sheet :   Rationale: increase strength, improve coordination, improve balance, and increase proprioception  to improve the patients ability to perform transfers, stair negotiation, functional lifts, and functional carries. Pt education: technique at RDL      29 min Neuromuscular Re-education:  [x]  See flow sheet :   Rationale: increase strength, improve coordination, improve balance and increase proprioception  to improve the patients ability to perform functional tasks with improved abdominal brace utilization, improved control, and decreased risk for falls. 13 min Gait Training: TM Gait Training at 3.6 mph, 0 incline, focus on heel strike  60 feet x 2 of following-  [] March            [] Lateral                   [] Horizontal HT  [] Tandem         [x] Banded Lateral     [] Vertical HT  [] Retrograde    [x] Banded Monster   [] Dual Task  [] Hurdles          [] Ruffin Halsted                    [] Ladder Drills  [x] Heel Walk      [x] Toe Walk   Rationale: improve safety with household/community ambulation.             With   [] TE   [x] TA   [x] neuro   [] other: Patient Education: [x] Review HEP    [] Progressed/Changed HEP based on:   [] positioning   [] body mechanics   [] transfers   [] heat/ice application    [] other:      Other Objective/Functional Measures: -Added single leg RDLs     Pain Level (0-10 scale) post treatment: 1-2    ASSESSMENT/Changes in Function: Patient continues to be challenged by narrow base of support on foam due to primarily right ankle instability. Right lateral step downs much more challenging for patient, requires frequent UE assist to perform with improved eccentric quadriceps control. Patient will continue to benefit from skilled PT services to modify and progress therapeutic interventions, address functional mobility deficits, address ROM deficits, address strength deficits, analyze and address soft tissue restrictions, analyze and cue movement patterns, analyze and modify body mechanics/ergonomics, assess and modify postural abnormalities and address imbalance/dizziness to attain remaining goals. []  See Plan of Care  []  See progress note/recertification  []  See Discharge Summary         Progress towards goals / Updated goals:  1. Pt to demo at least 50 deg of right great toe AAROM to improve ease with toe off phase of gait. PN: 36 deg   2. Pt to demo SLS balance of at least 30\" B to improve safety during PLOF exercise classes. PN: right SLS 25\"   Current: progressing, addressing SLS with EO/EC (10/17/22)  3. Pt to demonstrate 5/5 right hip abduction MMT to increase ease of prolonged standing. PN: 4+/5   4. Pt will increase right hip ER AROM to >/= 34 degrees to improve ease with squatting.    PN: 23 deg right hip ER AROM in sitting     PLAN  [x]  Upgrade activities as tolerated     [x]  Continue plan of care  []  Update interventions per flow sheet       []  Discharge due to:_  []  Other:_      Will Pin 10/17/2022  9:02 AM    Future Appointments   Date Time Provider Michael Orosco   10/17/2022  9:15 AM Navya Raza [facility]   10/24/2022  9:15 AM Jim Weber [facility]   10/31/2022  9:15 [facility]

## 2022-10-24 ENCOUNTER — HOSPITAL ENCOUNTER (OUTPATIENT)
Dept: PHYSICAL THERAPY | Age: 62
Discharge: HOME | End: 2022-10-24
Payer: COMMERCIAL

## 2022-10-24 PROCEDURE — 97116 GAIT TRAINING THERAPY: CPT

## 2022-10-24 PROCEDURE — 97112 NEUROMUSCULAR REEDUCATION: CPT

## 2022-10-24 PROCEDURE — 97530 THERAPEUTIC ACTIVITIES: CPT

## 2022-10-24 NOTE — PROGRESS NOTES
Physical Therapy Discharge Instructions      [facility] - [address] [facility]  [phone] fax    Patient: Jose Burnett  : 1960    Continue Home Exercise Program 2-3 times per week for 8 weeks, then decrease to 1-2 times per week. Continue with    [x] Ice  as needed 2-3 times per day    [x] Heat           Follow up with MD:     [] Upon completion of therapy     [x] As needed      Recommendations:     [x]   Return to activity with home program    []   Return to activity with the following modifications:       []Post Rehab Program    []Join Independent aquatic program     []Return to/join local gym    Additional Comments: [facility], it has been a pleasure working with you and seeing all of your progress. Keep up the good work with your trainers and please reach out in the future if you have any questions. I wish you the best of health in your continued recovery.     Margareth Feldman PT, DPT 10/24/2022 9:51 AM

## 2022-10-24 NOTE — PROGRESS NOTES
[facility] AT [address]. [address] [place] [place] [address]  Phone: [phone] Fax [phone] SUMMARY  Patient Name: Carly Chamorro : 1960   Treatment/Medical Diagnosis: Other low back pain [M54.59]   Referral Source: Natividad Duran MD     Date of Initial Visit: 8/3/22 Attended Visits: 16 Missed Visits: 0     SUMMARY OF TREATMENT  Pt is a pleasant 63 y.o. female who presents with c/o low back pain following lumbar microdiscectomy on 22. Treatment has consisted of the following: Therapeutic exercise, Therapeutic activities, Neuromuscular re-education, Physical agent/modality, Gait/balance training, Manual therapy, Patient education, Self Care training, Functional mobility training, Home safety training, and Stair training. CURRENT STATUS  Pt attended therapy consistently for 16 visits for the treatment of low back pain and right LE pain/weakness following microdiscectomy on 22. At this point, the patient reports substantial improvement since [facility] with specific improvements in the following areas: improved left hip/ankle strength, improved balance abilities, improved ability to perform functional lifts/carries, and decreased low back pain. The patient has also demonstrated improvement in her FOTO score from 60 pts to 77 pts, demonstrating improved function in the home and community. She continues to be limited by persistent right foot numbness which impairs her balance abilities. The patient is appropriate for discharge at this time with a comprehensive HEP and will follow up with our office about any questions that arise following discharge.  Thank you for this referral.    LE Strength:    Right (/5) Left (/5)   Hip     Flexion 5 5             Abduction 5 5             Adduction                 Extension 5 5             ER 5 5             IR 4+ 5   Knee   Extension 5 5              Flexion 5 5      Stair Negotiation: reciprocal step, increased genu valgus on right  Balance: SLS right 20\" max, left 30\"      1. Pt to demo at least 50 deg of right great toe AAROM to improve ease with toe off phase of gait. PN: 36 deg   Current: not met, 32 deg (10/24/22)  2. Pt to demo SLS balance of at least 30\" B to improve safety during PLOF exercise classes. PN: right SLS 25\"   Current: not met, 8\" on average, 20\" max (10/24/22)  3. Pt to demonstrate 5/5 right hip abduction MMT to increase ease of prolonged standing. PN: 4+/5   Current: met, 5/5 (10/24/22)  4. Pt will increase right hip ER AROM to >/= 34 degrees to improve ease with squatting. PN: 23 deg right hip ER AROM in sitting   Current: progressing, 31 deg (10/24/22)    RECOMMENDATIONS  Discontinue therapy. Progressing towards or have reached established goals. If you have any questions/comments please contact us directly at [phone]. Thank you for allowing us to assist in the care of your patient.   Therapist Signature: Nichole Killian Date: 10/24/22   Reporting Period: 10/4/22-10/24/22 Time: 1:43 PM

## 2022-10-24 NOTE — PROGRESS NOTES
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 \"    OBJECTIVE    18 min Therapeutic Activity:  [x]  See flow sheet :   Rationale: increase strength, improve coordination, improve balance, and increase proprioception  to improve the patients ability to perform transfers, stair negotiation, functional lifts, and functional carries. Pt education: progress toward goals, updated HEP, discharge planning, nerve regrowth time frames     27 min Neuromuscular Re-education:  [x]  See flow sheet :   Rationale: increase strength, improve coordination, improve balance and increase proprioception  to improve the patients ability to perform functional tasks with improved abdominal brace utilization, improved control, and decreased risk for falls. 10 min Gait Trainin feet x 2 of following-  [x] Tandem FWD/BACK         [x] Banded Lateral     [] Vertical HT  [x] Heel Walk      [x] Toe Walk   Rationale: improve safety with household/community ambulation.             With   [] TE   [] TA   [] neuro   [] other: Patient Education: [x] Review HEP    [] Progressed/Changed HEP based on:   [] positioning   [] body mechanics   [] transfers   [] heat/ice application    [] other:      Other Objective/Functional Measures:   LE Strength:   Right (/5) Left (/5)   Hip     Flexion 5 5 Abduction 5 5             Adduction               Extension 5 5             ER 5 5             IR 4+ 5   Knee   Extension 5 5              Flexion 5 5     Stair Negotiation: reciprocal step, increased genu valgus on right  Balance: SLS right 20\" max, left 30\"     Pain Level (0-10 scale) post treatment: 0    ASSESSMENT/Changes in Function: Pt attended therapy consistently for 16 visits for the treatment of low back pain and right LE pain/weakness following microdiscectomy on 6/22/22. At this point, the patient reports substantial improvement since [facility] with specific improvements in the following areas: improved left hip/ankle strength, improved balance abilities, improved ability to perform functional lifts/carries, and decreased low back pain. The patient has also demonstrated improvement in her FOTO score from 60 pts to 77 pts, demonstrating improved function in the home and community. She continues to be limited by persistent right foot numbness which impairs her balance abilities. The patient is appropriate for discharge at this time with a comprehensive HEP and will follow up with our office about any questions that arise following discharge. Thank you for this referral.     []  See Plan of Care  []  See progress note/recertification  [x]  See Discharge Summary         Progress towards goals / Updated goals:  1. Pt to demo at least 50 deg of right great toe AAROM to improve ease with toe off phase of gait. PN: 36 deg   Current: not met, 32 deg (10/24/22)  2. Pt to demo SLS balance of at least 30\" B to improve safety during PLOF exercise classes. PN: right SLS 25\"   Current: not met, 8\" on average, 20\" max (10/24/22)  3. Pt to demonstrate 5/5 right hip abduction MMT to increase ease of prolonged standing. PN: 4+/5   Current: met, 5/5 (10/24/22)  4. Pt will increase right hip ER AROM to >/= 34 degrees to improve ease with squatting.    PN: 23 deg right hip ER AROM in sitting   Current: progressing, 31 deg (10/24/22)    PLAN  []  Upgrade activities as tolerated     []  Continue plan of care  []  Update interventions per flow sheet       [x]  Discharge due to: progress toward goals  []  Other:_      Carvel Forward 10/24/2022  7:43 AM    Future Appointments   Date Time Provider Michael Orosco   10/24/2022  9:15 AM Kaden Ibarra [facility]

## 2022-10-31 ENCOUNTER — APPOINTMENT (OUTPATIENT)
Dept: PHYSICAL THERAPY | Age: 62
End: 2022-10-31
Payer: COMMERCIAL

## (undated) DEVICE — CATH URETH INTMIT ROB 16FR FUN -- CONVERT TO ITEM 179520

## (undated) DEVICE — 3L THIN WALL CAN: Brand: CRD

## (undated) DEVICE — KENDALL SCD EXPRESS SLEEVES, KNEE LENGTH, MEDIUM: Brand: KENDALL SCD

## (undated) DEVICE — SOLUTION IRRIG 3000ML 0.9% SOD CHL FLX CONT 0797208] ICU MEDICAL INC]

## (undated) DEVICE — TRAY PREP DRY W/ PREM GLV 2 APPL 6 SPNG 2 UNDPD 1 OVERWRAP

## (undated) DEVICE — STERILE LATEX POWDER-FREE SURGICAL GLOVESWITH NITRILE COATING: Brand: PROTEXIS

## (undated) DEVICE — Z DISCONTINUED NO SUB IDED KIT ENDOMET ABLAT IMPED CTRL DEV W/ RF CTRL FTSWCH SUCT LN

## (undated) DEVICE — MEDI-VAC NON-CONDUCTIVE SUCTION TUBING 6MM X 6.1M (20 FT.) L: Brand: CARDINAL HEALTH

## (undated) DEVICE — HYSTEROSCOPIC PROCEDURE KIT: Brand: QUICKPICK

## (undated) DEVICE — TELFA NON-ADHERENT PADS PREPAK: Brand: TELFA

## (undated) DEVICE — DEPAUL HYSTEROSCOPY PACK: Brand: MEDLINE INDUSTRIES, INC.